# Patient Record
Sex: FEMALE | Race: WHITE | NOT HISPANIC OR LATINO
[De-identification: names, ages, dates, MRNs, and addresses within clinical notes are randomized per-mention and may not be internally consistent; named-entity substitution may affect disease eponyms.]

---

## 2018-11-13 ENCOUNTER — APPOINTMENT (OUTPATIENT)
Dept: INTERNAL MEDICINE | Facility: CLINIC | Age: 68
End: 2018-11-13
Payer: MEDICARE

## 2018-11-13 ENCOUNTER — APPOINTMENT (OUTPATIENT)
Dept: INTERNAL MEDICINE | Facility: CLINIC | Age: 68
End: 2018-11-13

## 2018-11-13 DIAGNOSIS — R09.89 OTHER SPECIFIED SYMPTOMS AND SIGNS INVOLVING THE CIRCULATORY AND RESPIRATORY SYSTEMS: ICD-10-CM

## 2018-11-13 PROCEDURE — 93880 EXTRACRANIAL BILAT STUDY: CPT

## 2018-11-13 PROCEDURE — 90670 PCV13 VACCINE IM: CPT

## 2018-11-13 PROCEDURE — G0009: CPT

## 2018-11-14 ENCOUNTER — TRANSCRIPTION ENCOUNTER (OUTPATIENT)
Age: 68
End: 2018-11-14

## 2019-01-01 PROBLEM — R09.89 BRUIT: Status: ACTIVE | Noted: 2019-01-01

## 2019-05-15 ENCOUNTER — APPOINTMENT (OUTPATIENT)
Dept: INTERNAL MEDICINE | Facility: CLINIC | Age: 69
End: 2019-05-15
Payer: MEDICARE

## 2019-05-15 PROCEDURE — 36415 COLL VENOUS BLD VENIPUNCTURE: CPT

## 2019-05-20 LAB
MEV IGG FLD QL IA: 70.1 AU/ML
MEV IGG+IGM SER-IMP: POSITIVE
MUV AB SER-ACNC: POSITIVE
MUV IGG SER QL IA: >300 AU/ML
RUBV IGG FLD-ACNC: 29.2 INDEX
RUBV IGG SER-IMP: POSITIVE

## 2022-09-05 ENCOUNTER — NON-APPOINTMENT (OUTPATIENT)
Age: 72
End: 2022-09-05

## 2023-01-18 ENCOUNTER — NON-APPOINTMENT (OUTPATIENT)
Age: 73
End: 2023-01-18

## 2023-01-18 ENCOUNTER — APPOINTMENT (OUTPATIENT)
Dept: INTERNAL MEDICINE | Facility: CLINIC | Age: 73
End: 2023-01-18
Payer: MEDICARE

## 2023-01-18 VITALS
HEART RATE: 66 BPM | DIASTOLIC BLOOD PRESSURE: 72 MMHG | TEMPERATURE: 98.1 F | WEIGHT: 105 LBS | OXYGEN SATURATION: 98 % | HEIGHT: 60 IN | SYSTOLIC BLOOD PRESSURE: 164 MMHG | BODY MASS INDEX: 20.62 KG/M2

## 2023-01-18 VITALS — DIASTOLIC BLOOD PRESSURE: 68 MMHG | SYSTOLIC BLOOD PRESSURE: 132 MMHG

## 2023-01-18 DIAGNOSIS — E55.9 VITAMIN D DEFICIENCY, UNSPECIFIED: ICD-10-CM

## 2023-01-18 DIAGNOSIS — R00.2 PALPITATIONS: ICD-10-CM

## 2023-01-18 DIAGNOSIS — E78.00 PURE HYPERCHOLESTEROLEMIA, UNSPECIFIED: ICD-10-CM

## 2023-01-18 DIAGNOSIS — E53.8 DEFICIENCY OF OTHER SPECIFIED B GROUP VITAMINS: ICD-10-CM

## 2023-01-18 DIAGNOSIS — E07.9 DISORDER OF THYROID, UNSPECIFIED: ICD-10-CM

## 2023-01-18 DIAGNOSIS — Z11.59 ENCOUNTER FOR SCREENING FOR OTHER VIRAL DISEASES: ICD-10-CM

## 2023-01-18 DIAGNOSIS — R73.9 HYPERGLYCEMIA, UNSPECIFIED: ICD-10-CM

## 2023-01-18 DIAGNOSIS — Z00.00 ENCOUNTER FOR GENERAL ADULT MEDICAL EXAMINATION W/OUT ABNORMAL FINDINGS: ICD-10-CM

## 2023-01-18 DIAGNOSIS — E03.9 HYPOTHYROIDISM, UNSPECIFIED: ICD-10-CM

## 2023-01-18 LAB
BILIRUB UR QL STRIP: NEGATIVE
CLARITY UR: CLEAR
COLLECTION METHOD: NORMAL
GLUCOSE UR-MCNC: NEGATIVE
HCG UR QL: 0.2 EU/DL
HGB UR QL STRIP.AUTO: NORMAL
KETONES UR-MCNC: NEGATIVE
LEUKOCYTE ESTERASE UR QL STRIP: NEGATIVE
NITRITE UR QL STRIP: NEGATIVE
PH UR STRIP: 6
PROT UR STRIP-MCNC: NEGATIVE
SP GR UR STRIP: 1.02

## 2023-01-18 PROCEDURE — 93000 ELECTROCARDIOGRAM COMPLETE: CPT | Mod: 59

## 2023-01-18 PROCEDURE — 81003 URINALYSIS AUTO W/O SCOPE: CPT | Mod: QW

## 2023-01-18 PROCEDURE — G0444 DEPRESSION SCREEN ANNUAL: CPT

## 2023-01-18 PROCEDURE — G0439: CPT

## 2023-01-18 RX ORDER — LEVOTHYROXINE SODIUM 75 UG/1
75 TABLET ORAL
Refills: 0 | Status: ACTIVE | COMMUNITY

## 2023-01-18 RX ORDER — ADHESIVE TAPE 3"X 2.3 YD
50 MCG TAPE, NON-MEDICATED TOPICAL
Refills: 0 | Status: ACTIVE | COMMUNITY

## 2023-01-18 NOTE — HEALTH RISK ASSESSMENT
[Very Good] : ~his/her~  mood as very good [Never] : Never [No] : In the past 12 months have you used drugs other than those required for medical reasons? No [No falls in past year] : Patient reported no falls in the past year [0] : 2) Feeling down, depressed, or hopeless: Not at all (0) [PHQ-2 Negative - No further assessment needed] : PHQ-2 Negative - No further assessment needed [Audit-CScore] : 0 [[id number]] : 0 [HIV test declined] : HIV test declined [Hepatitis C test declined] : Hepatitis C test declined [Change in mental status noted] : No change in mental status noted [None] : None [] :  [# Of Children ___] : has [unfilled] children [Feels Safe at Home] : Feels safe at home [Fully functional (bathing, dressing, toileting, transferring, walking, feeding)] : Fully functional (bathing, dressing, toileting, transferring, walking, feeding) [Fully functional (using the telephone, shopping, preparing meals, housekeeping, doing laundry, using] : Fully functional and needs no help or supervision to perform IADLs (using the telephone, shopping, preparing meals, housekeeping, doing laundry, using transportation, managing medications and managing finances) [Reports changes in hearing] : Reports no changes in hearing [Reports changes in vision] : Reports no changes in vision [Reports changes in dental health] : Reports changes in dental health [Smoke Detector] : smoke detector [Carbon Monoxide Detector] : carbon monoxide detector [Seat Belt] :  uses seat belt [Sunscreen] : uses sunscreen [MammogramDate] : 2021 [PapSmearDate] : 2021 [PapSmearComments] : Dr. Luna [BoneDensityDate] : 2021 [ColonoscopyDate] : 2013 [ColonoscopyComments] : Dr. Sky [FreeTextEntry3] : 10 grandchildren

## 2023-02-06 DIAGNOSIS — Z12.11 ENCOUNTER FOR SCREENING FOR MALIGNANT NEOPLASM OF COLON: ICD-10-CM

## 2023-02-08 ENCOUNTER — RESULT CHARGE (OUTPATIENT)
Age: 73
End: 2023-02-08

## 2023-02-09 DIAGNOSIS — R50.9 FEVER, UNSPECIFIED: ICD-10-CM

## 2023-02-09 DIAGNOSIS — R39.9 UNSPECIFIED SYMPTOMS AND SIGNS INVOLVING THE GENITOURINARY SYSTEM: ICD-10-CM

## 2023-02-10 ENCOUNTER — TRANSCRIPTION ENCOUNTER (OUTPATIENT)
Age: 73
End: 2023-02-10

## 2023-02-10 ENCOUNTER — LABORATORY RESULT (OUTPATIENT)
Age: 73
End: 2023-02-10

## 2023-02-13 ENCOUNTER — NON-APPOINTMENT (OUTPATIENT)
Age: 73
End: 2023-02-13

## 2023-02-13 DIAGNOSIS — K21.9 GASTRO-ESOPHAGEAL REFLUX DISEASE W/OUT ESOPHAGITIS: ICD-10-CM

## 2023-02-13 LAB
APPEARANCE: CLEAR
BASOPHILS # BLD AUTO: 0.04 K/UL
BASOPHILS NFR BLD AUTO: 0.4 %
BILIRUBIN URINE: NEGATIVE
BLOOD URINE: ABNORMAL
COLOR: YELLOW
CRP SERPL-MCNC: 144 MG/L
EOSINOPHIL # BLD AUTO: 0.01 K/UL
EOSINOPHIL NFR BLD AUTO: 0.1 %
GLUCOSE QUALITATIVE U: NEGATIVE
HCT VFR BLD CALC: 34 %
HGB BLD-MCNC: 10 G/DL
IMM GRANULOCYTES NFR BLD AUTO: 0.3 %
KETONES URINE: NORMAL
LEUKOCYTE ESTERASE URINE: ABNORMAL
LYMPHOCYTES # BLD AUTO: 1.07 K/UL
LYMPHOCYTES NFR BLD AUTO: 11.7 %
MAN DIFF?: NORMAL
MCHC RBC-ENTMCNC: 26.6 PG
MCHC RBC-ENTMCNC: 29.4 GM/DL
MCV RBC AUTO: 90.4 FL
MONOCYTES # BLD AUTO: 0.81 K/UL
MONOCYTES NFR BLD AUTO: 8.8 %
NEUTROPHILS # BLD AUTO: 7.2 K/UL
NEUTROPHILS NFR BLD AUTO: 78.7 %
NITRITE URINE: NEGATIVE
PH URINE: 6.5
PLATELET # BLD AUTO: 405 K/UL
PROTEIN URINE: ABNORMAL
RBC # BLD: 3.76 M/UL
RBC # FLD: 14.8 %
SPECIFIC GRAVITY URINE: 1.03
UROBILINOGEN URINE: NORMAL
WBC # FLD AUTO: 9.16 K/UL

## 2023-02-28 ENCOUNTER — OUTPATIENT (OUTPATIENT)
Dept: OUTPATIENT SERVICES | Facility: HOSPITAL | Age: 73
LOS: 1 days | Discharge: HOME | End: 2023-02-28

## 2023-02-28 ENCOUNTER — NON-APPOINTMENT (OUTPATIENT)
Age: 73
End: 2023-02-28

## 2023-02-28 DIAGNOSIS — M85.10: ICD-10-CM

## 2023-03-01 ENCOUNTER — RESULT REVIEW (OUTPATIENT)
Age: 73
End: 2023-03-01

## 2023-03-01 ENCOUNTER — APPOINTMENT (OUTPATIENT)
Dept: HEMATOLOGY ONCOLOGY | Facility: CLINIC | Age: 73
End: 2023-03-01
Payer: MEDICARE

## 2023-03-01 ENCOUNTER — NON-APPOINTMENT (OUTPATIENT)
Age: 73
End: 2023-03-01

## 2023-03-01 VITALS
WEIGHT: 97 LBS | BODY MASS INDEX: 19.82 KG/M2 | OXYGEN SATURATION: 97 % | HEIGHT: 58.66 IN | HEART RATE: 73 BPM | TEMPERATURE: 97 F | DIASTOLIC BLOOD PRESSURE: 76 MMHG | RESPIRATION RATE: 17 BRPM | SYSTOLIC BLOOD PRESSURE: 156 MMHG

## 2023-03-01 DIAGNOSIS — R63.4 ABNORMAL WEIGHT LOSS: ICD-10-CM

## 2023-03-01 DIAGNOSIS — M62.9 DISORDER OF MUSCLE, UNSPECIFIED: ICD-10-CM

## 2023-03-01 DIAGNOSIS — R53.83 OTHER FATIGUE: ICD-10-CM

## 2023-03-01 LAB
ALBUMIN SERPL ELPH-MCNC: 3.7 G/DL
ALP BLD-CCNC: 111 U/L
ALT SERPL-CCNC: 9 U/L
ANION GAP SERPL CALC-SCNC: 15 MMOL/L
AST SERPL-CCNC: 12 U/L
B2 MICROGLOB SERPL-MCNC: 2.2 MG/L
BASOPHILS # BLD AUTO: 0.03 K/UL — SIGNIFICANT CHANGE UP (ref 0–0.2)
BASOPHILS NFR BLD AUTO: 0.3 % — SIGNIFICANT CHANGE UP (ref 0–2)
BILIRUB SERPL-MCNC: 0.3 MG/DL
BUN SERPL-MCNC: 12 MG/DL
CALCIUM SERPL-MCNC: 9.8 MG/DL
CHLORIDE SERPL-SCNC: 99 MMOL/L
CO2 SERPL-SCNC: 25 MMOL/L
CREAT SERPL-MCNC: 0.62 MG/DL
EGFR: 95 ML/MIN/1.73M2
EOSINOPHIL # BLD AUTO: 0.01 K/UL — SIGNIFICANT CHANGE UP (ref 0–0.5)
EOSINOPHIL NFR BLD AUTO: 0.1 % — SIGNIFICANT CHANGE UP (ref 0–6)
FERRITIN SERPL-MCNC: 971 NG/ML
FOLATE SERPL-MCNC: 13.6 NG/ML
GLUCOSE SERPL-MCNC: 117 MG/DL
HAPTOGLOB SERPL-MCNC: 566 MG/DL
HCT VFR BLD CALC: 29.9 % — LOW (ref 34.5–45)
HGB BLD-MCNC: 8.9 G/DL — LOW (ref 11.5–15.5)
IMM GRANULOCYTES NFR BLD AUTO: 0.4 % — SIGNIFICANT CHANGE UP (ref 0–0.9)
IRON SATN MFR SERPL: 8 %
IRON SERPL-MCNC: 14 UG/DL
LDH SERPL-CCNC: 143 U/L
LYMPHOCYTES # BLD AUTO: 0.76 K/UL — LOW (ref 1–3.3)
LYMPHOCYTES # BLD AUTO: 8.5 % — LOW (ref 13–44)
MCHC RBC-ENTMCNC: 25.9 PG — LOW (ref 27–34)
MCHC RBC-ENTMCNC: 29.8 G/DL — LOW (ref 32–36)
MCV RBC AUTO: 87.2 FL — SIGNIFICANT CHANGE UP (ref 80–100)
MONOCYTES # BLD AUTO: 0.82 K/UL — SIGNIFICANT CHANGE UP (ref 0–0.9)
MONOCYTES NFR BLD AUTO: 9.2 % — SIGNIFICANT CHANGE UP (ref 2–14)
NEUTROPHILS # BLD AUTO: 7.24 K/UL — SIGNIFICANT CHANGE UP (ref 1.8–7.4)
NEUTROPHILS NFR BLD AUTO: 81.5 % — HIGH (ref 43–77)
NRBC # BLD: 0 /100 WBCS — SIGNIFICANT CHANGE UP (ref 0–0)
PLATELET # BLD AUTO: 407 K/UL — HIGH (ref 150–400)
POTASSIUM SERPL-SCNC: 4.7 MMOL/L
PROT SERPL-MCNC: 7.2 G/DL
RBC # BLD: 3.43 M/UL — LOW (ref 3.8–5.2)
RBC # FLD: 15.1 % — HIGH (ref 10.3–14.5)
RETICS #: 46.3 K/UL — SIGNIFICANT CHANGE UP (ref 25–125)
RETICS/RBC NFR: 1.4 % — SIGNIFICANT CHANGE UP (ref 0.5–2.5)
SODIUM SERPL-SCNC: 140 MMOL/L
TIBC SERPL-MCNC: 170 UG/DL
TSH SERPL-ACNC: 0.44 UIU/ML
UIBC SERPL-MCNC: 156 UG/DL
URATE SERPL-MCNC: 5.5 MG/DL
VIT B12 SERPL-MCNC: 1148 PG/ML
WBC # BLD: 8.9 K/UL — SIGNIFICANT CHANGE UP (ref 3.8–10.5)
WBC # FLD AUTO: 8.9 K/UL — SIGNIFICANT CHANGE UP (ref 3.8–10.5)

## 2023-03-01 PROCEDURE — 99205 OFFICE O/P NEW HI 60 MIN: CPT

## 2023-03-03 PROBLEM — R63.4 UNINTENTIONAL WEIGHT LOSS: Status: ACTIVE | Noted: 2023-03-03

## 2023-03-03 PROBLEM — R53.83 FATIGUE: Status: ACTIVE | Noted: 2023-03-03

## 2023-03-03 LAB
EPO SERPL-MCNC: 35 MIU/ML
FOLATE RBC-MCNC: 1346 NG/ML
HCT VFR BLD CALC: 30.6 %
ZINC SERPL-MCNC: 49 UG/DL

## 2023-03-08 LAB
ALBUMIN MFR SERPL ELPH: 41.6 %
ALBUMIN SERPL-MCNC: 3 G/DL
ALBUMIN/GLOB SERPL: 0.7 RATIO
ALPHA1 GLOB MFR SERPL ELPH: 10 %
ALPHA1 GLOB SERPL ELPH-MCNC: 0.7 G/DL
ALPHA2 GLOB MFR SERPL ELPH: 20.4 %
ALPHA2 GLOB SERPL ELPH-MCNC: 1.5 G/DL
B-GLOBULIN MFR SERPL ELPH: 13.1 %
B-GLOBULIN SERPL ELPH-MCNC: 0.9 G/DL
COPPER SERPL-MCNC: 229 UG/DL
CREAT 24H UR-MCNC: 0.7 G/24 H
CREAT ?TM UR-MCNC: 105 MG/DL
DEPRECATED KAPPA LC FREE/LAMBDA SER: 1.96 RATIO
GAMMA GLOB FLD ELPH-MCNC: 1.1 G/DL
GAMMA GLOB MFR SERPL ELPH: 14.9 %
IGA SER QL IEP: 172 MG/DL
IGG SER QL IEP: 1163 MG/DL
IGM SER QL IEP: 110 MG/DL
INTERPRETATION SERPL IEP-IMP: NORMAL
KAPPA LC CSF-MCNC: 1.77 MG/DL
KAPPA LC SERPL-MCNC: 3.47 MG/DL
M PROTEIN SPEC IFE-MCNC: NORMAL
PROT 24H UR-MRATE: 16 MG/DL
PROT ?TM UR-MCNC: 24 HR
PROT SERPL-MCNC: 7.2 G/DL
PROT SERPL-MCNC: 7.2 G/DL
PROT UR-MCNC: 104 MG/24 H
SPECIMEN VOL 24H UR: 650 ML

## 2023-03-11 LAB
ALBUPE: 12.4 %
ALPHA1UPE: 50.6 %
ALPHA2UPE: 21.4 %
BENCE JONES EXCRETION: 0 MG/24HR
BETAUPE: 8.5 %
CREAT 24H UR-MCNC: 0.7 G/24 H
CREATININE UR (MAYO): 105 MG/DL
GAMMAUPE: 7.1 %
IGA 24H UR QL IFE: NORMAL
KAPPA LC 24H UR QL: 0 MG/DL
M PROTEIN 24H MFR UR ELPH: 0 %
PROT ?TM UR-MCNC: 24 HR
PROT PATTERN 24H UR ELPH-IMP: NORMAL
PROT UR-MCNC: 15 MG/DL
PROT UR-MCNC: 15 MG/DL
SPECIMEN VOL 24H UR: 650 ML
U PROTEIN QNT CALCULATION: 98 MG/24 H

## 2023-03-13 ENCOUNTER — NON-APPOINTMENT (OUTPATIENT)
Age: 73
End: 2023-03-13

## 2023-03-13 RX ORDER — PANTOPRAZOLE 40 MG/1
40 TABLET, DELAYED RELEASE ORAL DAILY
Qty: 90 | Refills: 3 | Status: ACTIVE | COMMUNITY
Start: 2023-02-13 | End: 1900-01-01

## 2023-03-23 ENCOUNTER — LABORATORY RESULT (OUTPATIENT)
Age: 73
End: 2023-03-23

## 2023-03-23 ENCOUNTER — APPOINTMENT (OUTPATIENT)
Dept: HEMATOLOGY ONCOLOGY | Facility: CLINIC | Age: 73
End: 2023-03-23

## 2023-03-23 ENCOUNTER — RESULT REVIEW (OUTPATIENT)
Age: 73
End: 2023-03-23

## 2023-03-23 ENCOUNTER — APPOINTMENT (OUTPATIENT)
Dept: HEMATOLOGY ONCOLOGY | Facility: CLINIC | Age: 73
End: 2023-03-23
Payer: MEDICARE

## 2023-03-23 VITALS
OXYGEN SATURATION: 98 % | WEIGHT: 95.68 LBS | RESPIRATION RATE: 16 BRPM | DIASTOLIC BLOOD PRESSURE: 69 MMHG | TEMPERATURE: 97 F | BODY MASS INDEX: 19.55 KG/M2 | SYSTOLIC BLOOD PRESSURE: 144 MMHG | HEART RATE: 72 BPM

## 2023-03-23 LAB
BASOPHILS # BLD AUTO: 0.03 K/UL — SIGNIFICANT CHANGE UP (ref 0–0.2)
BASOPHILS NFR BLD AUTO: 0.4 % — SIGNIFICANT CHANGE UP (ref 0–2)
EOSINOPHIL # BLD AUTO: 0.01 K/UL — SIGNIFICANT CHANGE UP (ref 0–0.5)
EOSINOPHIL NFR BLD AUTO: 0.1 % — SIGNIFICANT CHANGE UP (ref 0–6)
HCT VFR BLD CALC: 25.9 % — LOW (ref 34.5–45)
HGB BLD-MCNC: 8 G/DL — LOW (ref 11.5–15.5)
IMM GRANULOCYTES NFR BLD AUTO: 0.9 % — SIGNIFICANT CHANGE UP (ref 0–0.9)
LYMPHOCYTES # BLD AUTO: 0.83 K/UL — LOW (ref 1–3.3)
LYMPHOCYTES # BLD AUTO: 9.8 % — LOW (ref 13–44)
MCHC RBC-ENTMCNC: 26.1 PG — LOW (ref 27–34)
MCHC RBC-ENTMCNC: 30.9 G/DL — LOW (ref 32–36)
MCV RBC AUTO: 84.4 FL — SIGNIFICANT CHANGE UP (ref 80–100)
MONOCYTES # BLD AUTO: 0.98 K/UL — HIGH (ref 0–0.9)
MONOCYTES NFR BLD AUTO: 11.6 % — SIGNIFICANT CHANGE UP (ref 2–14)
NEUTROPHILS # BLD AUTO: 6.55 K/UL — SIGNIFICANT CHANGE UP (ref 1.8–7.4)
NEUTROPHILS NFR BLD AUTO: 77.2 % — HIGH (ref 43–77)
NRBC # BLD: 0 /100 WBCS — SIGNIFICANT CHANGE UP (ref 0–0)
PLATELET # BLD AUTO: 371 K/UL — SIGNIFICANT CHANGE UP (ref 150–400)
RBC # BLD: 3.07 M/UL — LOW (ref 3.8–5.2)
RBC # FLD: 15.5 % — HIGH (ref 10.3–14.5)
WBC # BLD: 8.48 K/UL — SIGNIFICANT CHANGE UP (ref 3.8–10.5)
WBC # FLD AUTO: 8.48 K/UL — SIGNIFICANT CHANGE UP (ref 3.8–10.5)

## 2023-03-23 PROCEDURE — 38222 DX BONE MARROW BX & ASPIR: CPT

## 2023-03-23 NOTE — PROCEDURE
[Bone Marrow Biopsy] : bone marrow biopsy [Bone Marrow Aspiration] : bone marrow aspiration  [Patient] : the patient [Procedure verified and consent obtained] : procedure verified and consent obtained [Correct positioning] : correct positioning [Prone] : prone [The right posterior iliac crest was prepped with betadine and draped, using sterile technique.] : The right posterior iliac crest was prepped with betadine and draped, using sterile technique. [Lidocaine was injected and into the periosteum overlying the site.] : Lidocaine was injected and into the periosteum overlying the site. [Aspirate] : aspirate [Cytogenetics] : cytogenetics [FISH] : FISH [Biopsy] : biopsy [Flow Cytometry] : flow cytometry [] : The patient was instructed to remove the bandage the following AM. The patient may bathe. Acetaminophen may be taken for discomfort, as per package directions.If there are any other problems, the patient was instructed to call the office. The patient verbalized understanding, and is aware of the office contact numbers. [FreeTextEntry1] : MDS [FreeTextEntry2] : CBC prior to procedure.\par WBC 8.4\par Hgb 8.0\par Hct 25.9\par Plts 371\par BM biopsy and aspirate done.\par 6 cc's of 1 % Lidocaine injected.

## 2023-03-23 NOTE — REASON FOR VISIT
[Bone Marrow Biopsy] : bone marrow biopsy [Bone Marrow Aspiration] : bone marrow aspiration [FreeTextEntry2] : MDS

## 2023-04-03 ENCOUNTER — APPOINTMENT (OUTPATIENT)
Dept: HEMATOLOGY ONCOLOGY | Facility: CLINIC | Age: 73
End: 2023-04-03
Payer: MEDICARE

## 2023-04-03 ENCOUNTER — RESULT REVIEW (OUTPATIENT)
Age: 73
End: 2023-04-03

## 2023-04-03 ENCOUNTER — LABORATORY RESULT (OUTPATIENT)
Age: 73
End: 2023-04-03

## 2023-04-03 VITALS
WEIGHT: 95.46 LBS | BODY MASS INDEX: 19.5 KG/M2 | OXYGEN SATURATION: 99 % | TEMPERATURE: 97.2 F | HEART RATE: 82 BPM | DIASTOLIC BLOOD PRESSURE: 67 MMHG | SYSTOLIC BLOOD PRESSURE: 102 MMHG | RESPIRATION RATE: 16 BRPM

## 2023-04-03 LAB
BASOPHILS # BLD AUTO: 0.02 K/UL — SIGNIFICANT CHANGE UP (ref 0–0.2)
BASOPHILS NFR BLD AUTO: 0.2 % — SIGNIFICANT CHANGE UP (ref 0–2)
EOSINOPHIL # BLD AUTO: 0.02 K/UL — SIGNIFICANT CHANGE UP (ref 0–0.5)
EOSINOPHIL NFR BLD AUTO: 0.2 % — SIGNIFICANT CHANGE UP (ref 0–6)
HCT VFR BLD CALC: 24.3 % — LOW (ref 34.5–45)
HGB BLD-MCNC: 7 G/DL — CRITICAL LOW (ref 11.5–15.5)
IMM GRANULOCYTES NFR BLD AUTO: 0.4 % — SIGNIFICANT CHANGE UP (ref 0–0.9)
LYMPHOCYTES # BLD AUTO: 0.85 K/UL — LOW (ref 1–3.3)
LYMPHOCYTES # BLD AUTO: 9.2 % — LOW (ref 13–44)
MCHC RBC-ENTMCNC: 25 PG — LOW (ref 27–34)
MCHC RBC-ENTMCNC: 28.8 G/DL — LOW (ref 32–36)
MCV RBC AUTO: 86.8 FL — SIGNIFICANT CHANGE UP (ref 80–100)
MONOCYTES # BLD AUTO: 0.84 K/UL — SIGNIFICANT CHANGE UP (ref 0–0.9)
MONOCYTES NFR BLD AUTO: 9.1 % — SIGNIFICANT CHANGE UP (ref 2–14)
NEUTROPHILS # BLD AUTO: 7.43 K/UL — HIGH (ref 1.8–7.4)
NEUTROPHILS NFR BLD AUTO: 80.9 % — HIGH (ref 43–77)
NRBC # BLD: 0 /100 WBCS — SIGNIFICANT CHANGE UP (ref 0–0)
PLATELET # BLD AUTO: 425 K/UL — HIGH (ref 150–400)
RBC # BLD: 2.8 M/UL — LOW (ref 3.8–5.2)
RBC # FLD: 16 % — HIGH (ref 10.3–14.5)
WBC # BLD: 9.2 K/UL — SIGNIFICANT CHANGE UP (ref 3.8–10.5)
WBC # FLD AUTO: 9.2 K/UL — SIGNIFICANT CHANGE UP (ref 3.8–10.5)

## 2023-04-03 PROCEDURE — 99213 OFFICE O/P EST LOW 20 MIN: CPT

## 2023-04-03 NOTE — HISTORY OF PRESENT ILLNESS
[de-identified] : Ms. Rojas is a 71 yo Female with PMHx of anemia, hypothyroidism and vitamin D deficiency, who is seen in consult for skeletal lesions. Referred by her PCP, Dr. Castañeda.\par She reported developing back pain and some weakness in her legs for ~ 4 weeks. She was referred to see a physiatrist who evaluated her and ordered further diagnostic testing. Xray of left hip was done and was ruled out for fracture. However, MRI revealed: multilevel degenerative changes without significant spinal canal stenosis or nerve root compression. Nonspecific focus of marrow signal abnormality in L2. Further investigation with PET-CT showed multiple foci of intense FDG activity corresponding with osseous skeleton throughout her spine, pelvis, sacrum, iliac, left acetabulum (see result for detailed report). Today, she continues to have left sided low back pain that radiates to the left lateral thigh. She describes the pain to be constant and achy. It is worsened with sitting or lying down and improved with standing and walking. Denies numbness or tingling. \par She has been taking Tylenol everyday to help her sleep at night. Additionally, she was diagnosed with anemia in January 2023, and notes increased fatigue. She reports unintentional weight loss of 8 lbs in few months. No known renal insufficiency, or hypercalcemia. She denies fevers, night sweats. No recent infections or illnesses. Hx of wrist fractures in the past, but no new fx or bone pain. She is due for skeletal survey in May. \par \par \par \par

## 2023-04-03 NOTE — REASON FOR VISIT
[Initial Consultation] : an initial consultation for [Family Member] : family member [FreeTextEntry2] : skeletal lesions

## 2023-04-03 NOTE — END OF VISIT
[Time Spent: ___ minutes] : I have spent [unfilled] minutes of time on the encounter. [FreeTextEntry3] : Patient was seen with ACP who was present for the entire visit and has documented the visit. Note edited as needed.

## 2023-04-03 NOTE — REVIEW OF SYSTEMS
[Fatigue] : fatigue [Recent Change In Weight] : ~T recent weight change [Muscle Pain] : muscle pain [Negative] : Allergic/Immunologic [Fever] : no fever [Chills] : no chills [Night Sweats] : no night sweats [Muscle Weakness] : no muscle weakness [FreeTextEntry9] : low back pain radiating to left lateral thigh

## 2023-04-03 NOTE — RESULTS/DATA
[FreeTextEntry1] : 3/1/2023\par Hgb 8.9, MCV 87.2\par Platelet 407k\par Ferritin 971 \par CMP with normal kidney function, normal calcium\par Normal AST/Alk phos\par ALT 9 \par A/G ratio 1.05\par Beta 2 microglobulin 2.2\par Haptoglobin 566\par Pending other blood work \par \par 2/22 - MRI - Multilevel degenerate changes without significant spinal canal stenosis or nerve root compression.\par Nonspecific focus of marrow signal abnormality in L2\par \par 2/28 - PET-CT - Multiple foci of intense FDG activity corresponding with the osseous skeleton. One of the larger lesions is identified at the L2 vertebral body where there is a destructive lucent lesion on the left (SUV 12.1, approx 2.2cm). This appears malignant in nature. There is a small lucent lesion in the right L2 transverse process (SUV 4.6, ) There are additional lesions identified in the thoracic spine, and pelvis. A small lucent lesion in the anterior T12 vertebral body (SUV 10.2, 1.1cm) additional lesions in the spine are identified. there are multiple foci of FDG activity identified within the pelvis as well. For ex, a lesion in the right sacrum (SUV 8.1, 0.8cm) there are additional lesions identified in the left iliac bone and left acetabulum

## 2023-04-03 NOTE — ASSESSMENT
[FreeTextEntry1] : Ms. Rojas is a 73 yo Female with PMHx of anemia, hypothyroidism and vitamin D deficiency, who is seen in consult for skeletal lesions. She is newly anemic, increasingly fatigued and has 8 lb unintentional weight loss since January 2023. No renal insufficiency, hypercalcemia. Normal liver enzymes. AG ratio 1.05, which lowers suspicion for PCN. However primary bony malignancy cannot be ruled out. Will send work up to r/o MM or any other plasma cell neoplasms as well as full work up for anemia. May need BMBx. Will proceed with the following plan: \par \par [ ] Skeletal lytic lesion\par - PET-CT 2/28 showed multiple foci of intense FDG activity corresponding with osseous skeleton throughout her spine, pelvis, sacrum, iliac, left acetabulum\par - C/o back pain that radiates to left thigh \par - No renal insufficiency\par - No hypercalcemia \par - Unclear if it's primary bony lesion or secondary metastasis \par - MM work up sent \par - May need BMBx which was discussed in great details - pt agreeable  \par - Continue to f/u with physiatrist for pain \par \par [ ] Anemia, normocytic\par - Dx in Jan 2023\par - Hgb today 8.9, MCV 87.2\par - ? ARCH vs ? early MDS vs ?PCN\par - As mentioned above, may need BMBx\par - RTC in 1 month\par \par Patient seen and examined with Dr. Carcamo

## 2023-04-04 ENCOUNTER — RESULT REVIEW (OUTPATIENT)
Age: 73
End: 2023-04-04

## 2023-04-04 ENCOUNTER — APPOINTMENT (OUTPATIENT)
Dept: HEMATOLOGY ONCOLOGY | Facility: CLINIC | Age: 73
End: 2023-04-04

## 2023-04-04 ENCOUNTER — NON-APPOINTMENT (OUTPATIENT)
Age: 73
End: 2023-04-04

## 2023-04-04 ENCOUNTER — OUTPATIENT (OUTPATIENT)
Dept: OUTPATIENT SERVICES | Facility: HOSPITAL | Age: 73
LOS: 1 days | End: 2023-04-04
Payer: MEDICARE

## 2023-04-04 DIAGNOSIS — M85.10: ICD-10-CM

## 2023-04-04 LAB
ALBUMIN SERPL ELPH-MCNC: 3.3 G/DL
ALP BLD-CCNC: 135 U/L
ALT SERPL-CCNC: 39 U/L
ANION GAP SERPL CALC-SCNC: 15 MMOL/L
AST SERPL-CCNC: 25 U/L
BASOPHILS # BLD AUTO: 0.02 K/UL — SIGNIFICANT CHANGE UP (ref 0–0.2)
BASOPHILS NFR BLD AUTO: 0.2 % — SIGNIFICANT CHANGE UP (ref 0–2)
BILIRUB SERPL-MCNC: 0.2 MG/DL
BUN SERPL-MCNC: 22 MG/DL
CALCIUM SERPL-MCNC: 9.2 MG/DL
CHLORIDE SERPL-SCNC: 101 MMOL/L
CO2 SERPL-SCNC: 24 MMOL/L
CREAT SERPL-MCNC: 0.73 MG/DL
EGFR: 87 ML/MIN/1.73M2
EOSINOPHIL # BLD AUTO: 0.01 K/UL — SIGNIFICANT CHANGE UP (ref 0–0.5)
EOSINOPHIL NFR BLD AUTO: 0.1 % — SIGNIFICANT CHANGE UP (ref 0–6)
GLUCOSE SERPL-MCNC: 123 MG/DL
HAPTOGLOB SERPL-MCNC: 546 MG/DL
HCT VFR BLD CALC: 24.1 % — LOW (ref 34.5–45)
HGB BLD-MCNC: 7.1 G/DL — LOW (ref 11.5–15.5)
IMM GRANULOCYTES NFR BLD AUTO: 0.8 % — SIGNIFICANT CHANGE UP (ref 0–0.9)
LDH SERPL-CCNC: 150 U/L
LYMPHOCYTES # BLD AUTO: 0.85 K/UL — LOW (ref 1–3.3)
LYMPHOCYTES # BLD AUTO: 8.7 % — LOW (ref 13–44)
MCHC RBC-ENTMCNC: 25.4 PG — LOW (ref 27–34)
MCHC RBC-ENTMCNC: 29.6 G/DL — LOW (ref 32–36)
MCV RBC AUTO: 86 FL — SIGNIFICANT CHANGE UP (ref 80–100)
MONOCYTES # BLD AUTO: 0.95 K/UL — HIGH (ref 0–0.9)
MONOCYTES NFR BLD AUTO: 9.7 % — SIGNIFICANT CHANGE UP (ref 2–14)
NEUTROPHILS # BLD AUTO: 7.87 K/UL — HIGH (ref 1.8–7.4)
NEUTROPHILS NFR BLD AUTO: 80.5 % — HIGH (ref 43–77)
NRBC # BLD: 0 /100 WBCS — SIGNIFICANT CHANGE UP (ref 0–0)
PLATELET # BLD AUTO: 414 K/UL — HIGH (ref 150–400)
POTASSIUM SERPL-SCNC: 4.7 MMOL/L
PROT SERPL-MCNC: 6.6 G/DL
RBC # BLD: 2.79 M/UL — LOW (ref 3.8–5.2)
RBC # FLD: 16.1 % — HIGH (ref 10.3–14.5)
SODIUM SERPL-SCNC: 140 MMOL/L
WBC # BLD: 9.78 K/UL — SIGNIFICANT CHANGE UP (ref 3.8–10.5)
WBC # FLD AUTO: 9.78 K/UL — SIGNIFICANT CHANGE UP (ref 3.8–10.5)

## 2023-04-04 NOTE — HISTORY OF PRESENT ILLNESS
[de-identified] : Ms. Rojas is a 73 yo Female with PMHx of anemia, hypothyroidism and vitamin D deficiency, who is seen in consult for skeletal lesions. Referred by her PCP, Dr. Castañeda.\par She reported developing back pain and some weakness in her legs for ~ 4 weeks. She was referred to see a physiatrist who evaluated her and ordered further diagnostic testing. Xray of left hip was done and was ruled out for fracture. However, MRI revealed: multilevel degenerative changes without significant spinal canal stenosis or nerve root compression. Nonspecific focus of marrow signal abnormality in L2. Further investigation with PET-CT showed multiple foci of intense FDG activity corresponding with osseous skeleton throughout her spine, pelvis, sacrum, iliac, left acetabulum (see result for detailed report). Today, she continues to have left sided low back pain that radiates to the left lateral thigh. She describes the pain to be constant and achy. It is worsened with sitting or lying down and improved with standing and walking. Denies numbness or tingling. \par She has been taking Tylenol everyday to help her sleep at night. Additionally, she was diagnosed with anemia in January 2023, and notes increased fatigue. She reports unintentional weight loss of 8 lbs in few months. No known renal insufficiency, or hypercalcemia. She denies fevers, night sweats. No recent infections or illnesses. Hx of wrist fractures in the past, but no new fx or bone pain. She is due for skeletal survey in May. \par \par \par \par  [de-identified] : 4/3/2023\par Patient returns for a follow up. She has done well in the interim. Has not lost any further weight. Has had the bone marrow biopsy and PCN w/u done. The lab results are not suggestive of PCN. BMB showed trilineage hematopoiesis, not suggestive if PCN; no dysplasia, but has ATRX mutation.

## 2023-04-04 NOTE — ASSESSMENT
[FreeTextEntry1] : Ms. Rojas is a 71 yo Female with PMHx of anemia, hypothyroidism and vitamin D deficiency, who is seen in consult for skeletal lesions. She is newly anemic, increasingly fatigued and has 8 lb unintentional weight loss since January 2023. No renal insufficiency, hypercalcemia. Lab data and bone marrow biopsy are not suggestive of Myeloma. Noted to have ATRX mutation, which may explain anemia. Patient will eed a bone biopsy. \par \par [ ] Skeletal lytic lesion\par - PET-CT 2/28 showed multiple foci of intense FDG activity corresponding with osseous skeleton throughout her spine, pelvis, sacrum, iliac, left acetabulum\par - C/o back pain that radiates to left thigh \par - No renal insufficiency\par - No hypercalcemia \par - Unclear if it's primary bony lesion or secondary metastasis \par - MM work up; Normal SPEP, UPEP, No band in SIFE, UIFE \par - BMBx: (Normal) Trilineage hematopoiesis , no dysplasia\par - OnkoSight: ATRX mutation \par - May explain the anemia due to Hgb H \par - Schedule a Bone biopsy with IR\par \par \par [ ] Anemia, normocytic\par - Dx in Jan 2023\par - ? ARCH vs  CHIP (? early MDS) \par - BMBx: (Normal) Trilineage hematopoiesis , no dysplasia\par - ATRX mutation: Hemoglobin H disease? \par - Hemoglobin electrophoresis\par - Alpha globin gene study \par - Supportive transfusion per protocol \par - RTC in 2 months\par

## 2023-04-04 NOTE — REASON FOR VISIT
[Family Member] : family member [Follow-Up Visit] : a follow-up visit for [FreeTextEntry2] : skeletal lesions, anemia

## 2023-04-05 ENCOUNTER — APPOINTMENT (OUTPATIENT)
Dept: INFUSION THERAPY | Facility: HOSPITAL | Age: 73
End: 2023-04-05

## 2023-04-06 LAB — EPO SERPL-MCNC: 77.2 MIU/ML

## 2023-04-07 LAB
HGB A MFR BLD: 98 %
HGB A2 MFR BLD: 2 %
HGB FRACT BLD-IMP: NORMAL

## 2023-04-10 DIAGNOSIS — M89.9 DISORDER OF BONE, UNSPECIFIED: ICD-10-CM

## 2023-04-10 DIAGNOSIS — D64.9 ANEMIA, UNSPECIFIED: ICD-10-CM

## 2023-04-10 DIAGNOSIS — Z51.89 ENCOUNTER FOR OTHER SPECIFIED AFTERCARE: ICD-10-CM

## 2023-04-11 ENCOUNTER — APPOINTMENT (OUTPATIENT)
Dept: HEMATOLOGY ONCOLOGY | Facility: CLINIC | Age: 73
End: 2023-04-11

## 2023-04-11 ENCOUNTER — RESULT REVIEW (OUTPATIENT)
Age: 73
End: 2023-04-11

## 2023-04-11 LAB
BASOPHILS # BLD AUTO: 0.02 K/UL — SIGNIFICANT CHANGE UP (ref 0–0.2)
BASOPHILS NFR BLD AUTO: 0.3 % — SIGNIFICANT CHANGE UP (ref 0–2)
EOSINOPHIL # BLD AUTO: 0.02 K/UL — SIGNIFICANT CHANGE UP (ref 0–0.5)
EOSINOPHIL NFR BLD AUTO: 0.3 % — SIGNIFICANT CHANGE UP (ref 0–6)
HCT VFR BLD CALC: 33.5 % — LOW (ref 34.5–45)
HGB BLD-MCNC: 10 G/DL — LOW (ref 11.5–15.5)
IMM GRANULOCYTES NFR BLD AUTO: 0.5 % — SIGNIFICANT CHANGE UP (ref 0–0.9)
LYMPHOCYTES # BLD AUTO: 0.59 K/UL — LOW (ref 1–3.3)
LYMPHOCYTES # BLD AUTO: 7.4 % — LOW (ref 13–44)
MCHC RBC-ENTMCNC: 25.8 PG — LOW (ref 27–34)
MCHC RBC-ENTMCNC: 29.9 G/DL — LOW (ref 32–36)
MCV RBC AUTO: 86.6 FL — SIGNIFICANT CHANGE UP (ref 80–100)
MONOCYTES # BLD AUTO: 0.77 K/UL — SIGNIFICANT CHANGE UP (ref 0–0.9)
MONOCYTES NFR BLD AUTO: 9.6 % — SIGNIFICANT CHANGE UP (ref 2–14)
NEUTROPHILS # BLD AUTO: 6.55 K/UL — SIGNIFICANT CHANGE UP (ref 1.8–7.4)
NEUTROPHILS NFR BLD AUTO: 81.9 % — HIGH (ref 43–77)
NRBC # BLD: 0 /100 WBCS — SIGNIFICANT CHANGE UP (ref 0–0)
PLATELET # BLD AUTO: 391 K/UL — SIGNIFICANT CHANGE UP (ref 150–400)
RBC # BLD: 3.87 M/UL — SIGNIFICANT CHANGE UP (ref 3.8–5.2)
RBC # FLD: 16.3 % — HIGH (ref 10.3–14.5)
WBC # BLD: 7.99 K/UL — SIGNIFICANT CHANGE UP (ref 3.8–10.5)
WBC # FLD AUTO: 7.99 K/UL — SIGNIFICANT CHANGE UP (ref 3.8–10.5)

## 2023-04-24 ENCOUNTER — RESULT REVIEW (OUTPATIENT)
Age: 73
End: 2023-04-24

## 2023-04-24 ENCOUNTER — APPOINTMENT (OUTPATIENT)
Dept: HEMATOLOGY ONCOLOGY | Facility: CLINIC | Age: 73
End: 2023-04-24

## 2023-04-24 LAB
ALBUMIN SERPL ELPH-MCNC: 3.2 G/DL
ALP BLD-CCNC: 218 U/L
ALT SERPL-CCNC: 43 U/L
ANION GAP SERPL CALC-SCNC: 15 MMOL/L
AST SERPL-CCNC: 25 U/L
BASOPHILS # BLD AUTO: 0.03 K/UL — SIGNIFICANT CHANGE UP (ref 0–0.2)
BASOPHILS NFR BLD AUTO: 0.4 % — SIGNIFICANT CHANGE UP (ref 0–2)
BILIRUB SERPL-MCNC: 0.3 MG/DL
BUN SERPL-MCNC: 15 MG/DL
CALCIUM SERPL-MCNC: 9.4 MG/DL
CHLORIDE SERPL-SCNC: 99 MMOL/L
CO2 SERPL-SCNC: 25 MMOL/L
CREAT SERPL-MCNC: 0.65 MG/DL
EGFR: 93 ML/MIN/1.73M2
EOSINOPHIL # BLD AUTO: 0.02 K/UL — SIGNIFICANT CHANGE UP (ref 0–0.5)
EOSINOPHIL NFR BLD AUTO: 0.3 % — SIGNIFICANT CHANGE UP (ref 0–6)
GLUCOSE SERPL-MCNC: 105 MG/DL
HCT VFR BLD CALC: 28.7 % — LOW (ref 34.5–45)
HGB BLD-MCNC: 8.6 G/DL — LOW (ref 11.5–15.5)
IMM GRANULOCYTES NFR BLD AUTO: 0.8 % — SIGNIFICANT CHANGE UP (ref 0–0.9)
LYMPHOCYTES # BLD AUTO: 0.79 K/UL — LOW (ref 1–3.3)
LYMPHOCYTES # BLD AUTO: 10.5 % — LOW (ref 13–44)
MAGNESIUM SERPL-MCNC: 1.6 MG/DL
MCHC RBC-ENTMCNC: 26.3 PG — LOW (ref 27–34)
MCHC RBC-ENTMCNC: 30 G/DL — LOW (ref 32–36)
MCV RBC AUTO: 87.8 FL — SIGNIFICANT CHANGE UP (ref 80–100)
MONOCYTES # BLD AUTO: 0.65 K/UL — SIGNIFICANT CHANGE UP (ref 0–0.9)
MONOCYTES NFR BLD AUTO: 8.6 % — SIGNIFICANT CHANGE UP (ref 2–14)
NEUTROPHILS # BLD AUTO: 5.98 K/UL — SIGNIFICANT CHANGE UP (ref 1.8–7.4)
NEUTROPHILS NFR BLD AUTO: 79.4 % — HIGH (ref 43–77)
NRBC # BLD: 0 /100 WBCS — SIGNIFICANT CHANGE UP (ref 0–0)
PHOSPHATE SERPL-MCNC: 3.6 MG/DL
PLATELET # BLD AUTO: 365 K/UL — SIGNIFICANT CHANGE UP (ref 150–400)
POTASSIUM SERPL-SCNC: 4.4 MMOL/L
PROT SERPL-MCNC: 6.5 G/DL
RBC # BLD: 3.27 M/UL — LOW (ref 3.8–5.2)
RBC # FLD: 16.4 % — HIGH (ref 10.3–14.5)
SODIUM SERPL-SCNC: 139 MMOL/L
WBC # BLD: 7.53 K/UL — SIGNIFICANT CHANGE UP (ref 3.8–10.5)
WBC # FLD AUTO: 7.53 K/UL — SIGNIFICANT CHANGE UP (ref 3.8–10.5)

## 2023-05-02 ENCOUNTER — APPOINTMENT (OUTPATIENT)
Dept: INTERVENTIONAL RADIOLOGY/VASCULAR | Facility: CLINIC | Age: 73
End: 2023-05-02
Payer: MEDICARE

## 2023-05-02 ENCOUNTER — OUTPATIENT (OUTPATIENT)
Dept: OUTPATIENT SERVICES | Facility: HOSPITAL | Age: 73
LOS: 1 days | Discharge: HOME | End: 2023-05-02

## 2023-05-02 VITALS — BODY MASS INDEX: 18.95 KG/M2 | WEIGHT: 94 LBS | HEIGHT: 59 IN

## 2023-05-02 DIAGNOSIS — M85.10: ICD-10-CM

## 2023-05-02 DIAGNOSIS — M89.9 DISORDER OF BONE, UNSPECIFIED: ICD-10-CM

## 2023-05-02 DIAGNOSIS — Z78.9 OTHER SPECIFIED HEALTH STATUS: ICD-10-CM

## 2023-05-02 PROCEDURE — 99203 OFFICE O/P NEW LOW 30 MIN: CPT | Mod: 95

## 2023-05-02 NOTE — DATA REVIEWED
[FreeTextEntry1] : MRI and PET/Ct reviewed with patient. All questions answered during consultation.

## 2023-05-02 NOTE — REASON FOR VISIT
[Consultation] : a consultation visit [Home] : at home, [unfilled] , at the time of the visit. [Medical Office: ([facility])___] : at the medical office located in  [Patient] : the patient [Self] : self [FreeTextEntry1] : L2 lesion biopsy

## 2023-05-02 NOTE — HISTORY OF PRESENT ILLNESS
[FreeTextEntry1] : Patient is a 72 year old female with back pain and leg weakness starting February 2022. Workup for back pain revealed multiple skeletal lesions on MRI and PET. She has been referred to IR by Dr. Carcamo for consultation regarding an L2 lesion biopsy. \par \par The full procedure of CT-guided L2 lesion biopsy was discussed with the patient.  This included a discussion of the risks, benefits, and alternatives.  Risk of bleeding, infection, adjacent organ injury including nerve injury and pain were discussed.  Ample time was provided to answer questions.  Consent was obtained at the time of consultation.\par \par Plan: \par CT-guided L2 lytic lesion biopsy with sedation.  Patient in prone position\par

## 2023-05-03 LAB — HBA1 GENE MUT ANL BLD/T: NORMAL

## 2023-05-05 ENCOUNTER — RESULT REVIEW (OUTPATIENT)
Age: 73
End: 2023-05-05

## 2023-05-05 ENCOUNTER — OUTPATIENT (OUTPATIENT)
Dept: OUTPATIENT SERVICES | Facility: HOSPITAL | Age: 73
LOS: 1 days | End: 2023-05-05
Payer: MEDICARE

## 2023-05-05 DIAGNOSIS — M89.9 DISORDER OF BONE, UNSPECIFIED: ICD-10-CM

## 2023-05-05 PROCEDURE — 77012 CT SCAN FOR NEEDLE BIOPSY: CPT | Mod: 26

## 2023-05-05 PROCEDURE — 88307 TISSUE EXAM BY PATHOLOGIST: CPT | Mod: 26

## 2023-05-05 PROCEDURE — 20225 BONE BIOPSY TROCAR/NDL DEEP: CPT

## 2023-05-05 PROCEDURE — 88342 IMHCHEM/IMCYTCHM 1ST ANTB: CPT | Mod: 26

## 2023-05-05 PROCEDURE — 88341 IMHCHEM/IMCYTCHM EA ADD ANTB: CPT | Mod: 26

## 2023-05-05 PROCEDURE — 88173 CYTOPATH EVAL FNA REPORT: CPT | Mod: 26

## 2023-05-05 NOTE — PRE PROCEDURE NOTE - HISTORY OF PRESENT ILLNESS
Interventional Radiology  Pre-Procedure Note    This is a 72y  Female  presenting for biopsy    HPI:  72 year old female with back pain and leg weakness starting February 2022. Workup for back pain revealed multiple skeletal lesions on MRI and PET. She has been referred to IR by Dr. Carcamo for consultation regarding an L2 lesion biopsy.     PAST MEDICAL & SURGICAL HISTORY:      Social History:     FAMILY HISTORY:      Allergies: Allergy Status Unknown      Current Medications:     Labs:   CBC for Oncology (04.24.23 @ 09:15)    WBC Count: 7.53 K/uL   RBC Count: 3.27 M/uL   Hemoglobin: 8.6 g/dL   Hematocrit: 28.7 %   Mean Cell Volume: 87.8 fl   Mean Cell Hemoglobin: 26.3 pg   Mean Cell Hemoglobin Conc: 30.0 g/dL   Red Cell Distrib Width: 16.4 %   Platelet Count - Automated: 365 K/uL      Assessment/Plan:   This is a 72y Female  presents with L2 lesion  Patient presents to IR for CT biopsy.  Procedure/ risks/ benefits/ goals/ alternatives were explained. All questions answered. Informed content obtained from patient. Consent placed in chart.

## 2023-05-09 ENCOUNTER — APPOINTMENT (OUTPATIENT)
Dept: HEMATOLOGY ONCOLOGY | Facility: CLINIC | Age: 73
End: 2023-05-09

## 2023-05-09 ENCOUNTER — RESULT REVIEW (OUTPATIENT)
Age: 73
End: 2023-05-09

## 2023-05-09 LAB
BASOPHILS # BLD AUTO: 0.03 K/UL — SIGNIFICANT CHANGE UP (ref 0–0.2)
BASOPHILS NFR BLD AUTO: 0.4 % — SIGNIFICANT CHANGE UP (ref 0–2)
EOSINOPHIL # BLD AUTO: 0.03 K/UL — SIGNIFICANT CHANGE UP (ref 0–0.5)
EOSINOPHIL NFR BLD AUTO: 0.4 % — SIGNIFICANT CHANGE UP (ref 0–6)
HCT VFR BLD CALC: 23.5 % — LOW (ref 34.5–45)
HGB BLD-MCNC: 7.2 G/DL — LOW (ref 11.5–15.5)
IMM GRANULOCYTES NFR BLD AUTO: 1 % — HIGH (ref 0–0.9)
LYMPHOCYTES # BLD AUTO: 0.79 K/UL — LOW (ref 1–3.3)
LYMPHOCYTES # BLD AUTO: 9.6 % — LOW (ref 13–44)
MCHC RBC-ENTMCNC: 26.1 PG — LOW (ref 27–34)
MCHC RBC-ENTMCNC: 30.6 G/DL — LOW (ref 32–36)
MCV RBC AUTO: 85.1 FL — SIGNIFICANT CHANGE UP (ref 80–100)
MONOCYTES # BLD AUTO: 0.78 K/UL — SIGNIFICANT CHANGE UP (ref 0–0.9)
MONOCYTES NFR BLD AUTO: 9.5 % — SIGNIFICANT CHANGE UP (ref 2–14)
NEUTROPHILS # BLD AUTO: 6.52 K/UL — SIGNIFICANT CHANGE UP (ref 1.8–7.4)
NEUTROPHILS NFR BLD AUTO: 79.1 % — HIGH (ref 43–77)
NRBC # BLD: 0 /100 WBCS — SIGNIFICANT CHANGE UP (ref 0–0)
PLATELET # BLD AUTO: 348 K/UL — SIGNIFICANT CHANGE UP (ref 150–400)
RBC # BLD: 2.76 M/UL — LOW (ref 3.8–5.2)
RBC # FLD: 16.8 % — HIGH (ref 10.3–14.5)
WBC # BLD: 8.23 K/UL — SIGNIFICANT CHANGE UP (ref 3.8–10.5)
WBC # FLD AUTO: 8.23 K/UL — SIGNIFICANT CHANGE UP (ref 3.8–10.5)

## 2023-05-10 LAB — NON-GYNECOLOGICAL CYTOLOGY STUDY: SIGNIFICANT CHANGE UP

## 2023-05-11 ENCOUNTER — RESULT REVIEW (OUTPATIENT)
Age: 73
End: 2023-05-11

## 2023-05-11 ENCOUNTER — APPOINTMENT (OUTPATIENT)
Dept: HEMATOLOGY ONCOLOGY | Facility: CLINIC | Age: 73
End: 2023-05-11

## 2023-05-11 ENCOUNTER — OUTPATIENT (OUTPATIENT)
Dept: OUTPATIENT SERVICES | Facility: HOSPITAL | Age: 73
LOS: 1 days | End: 2023-05-11
Payer: MEDICARE

## 2023-05-11 DIAGNOSIS — D64.9 ANEMIA, UNSPECIFIED: ICD-10-CM

## 2023-05-11 DIAGNOSIS — M89.9 DISORDER OF BONE, UNSPECIFIED: ICD-10-CM

## 2023-05-11 LAB
ALPHA - GLOBIN COMMON MUTATION RESULT: NORMAL
BASOPHILS # BLD AUTO: 0.02 K/UL — SIGNIFICANT CHANGE UP (ref 0–0.2)
BASOPHILS NFR BLD AUTO: 0.2 % — SIGNIFICANT CHANGE UP (ref 0–2)
EOSINOPHIL # BLD AUTO: 0.01 K/UL — SIGNIFICANT CHANGE UP (ref 0–0.5)
EOSINOPHIL NFR BLD AUTO: 0.1 % — SIGNIFICANT CHANGE UP (ref 0–6)
HCT VFR BLD CALC: 26 % — LOW (ref 34.5–45)
HGB BLD-MCNC: 7.5 G/DL — LOW (ref 11.5–15.5)
IMM GRANULOCYTES NFR BLD AUTO: 0.6 % — SIGNIFICANT CHANGE UP (ref 0–0.9)
LYMPHOCYTES # BLD AUTO: 0.81 K/UL — LOW (ref 1–3.3)
LYMPHOCYTES # BLD AUTO: 10 % — LOW (ref 13–44)
MCHC RBC-ENTMCNC: 25.5 PG — LOW (ref 27–34)
MCHC RBC-ENTMCNC: 28.8 G/DL — LOW (ref 32–36)
MCV RBC AUTO: 88.4 FL — SIGNIFICANT CHANGE UP (ref 80–100)
MONOCYTES # BLD AUTO: 0.79 K/UL — SIGNIFICANT CHANGE UP (ref 0–0.9)
MONOCYTES NFR BLD AUTO: 9.8 % — SIGNIFICANT CHANGE UP (ref 2–14)
NEUTROPHILS # BLD AUTO: 6.41 K/UL — SIGNIFICANT CHANGE UP (ref 1.8–7.4)
NEUTROPHILS NFR BLD AUTO: 79.3 % — HIGH (ref 43–77)
NRBC # BLD: 0 /100 WBCS — SIGNIFICANT CHANGE UP (ref 0–0)
PLATELET # BLD AUTO: 399 K/UL — SIGNIFICANT CHANGE UP (ref 150–400)
RBC # BLD: 2.94 M/UL — LOW (ref 3.8–5.2)
RBC # FLD: 17.1 % — HIGH (ref 10.3–14.5)
WBC # BLD: 8.09 K/UL — SIGNIFICANT CHANGE UP (ref 3.8–10.5)
WBC # FLD AUTO: 8.09 K/UL — SIGNIFICANT CHANGE UP (ref 3.8–10.5)

## 2023-05-11 PROCEDURE — 86901 BLOOD TYPING SEROLOGIC RH(D): CPT

## 2023-05-11 PROCEDURE — 86900 BLOOD TYPING SEROLOGIC ABO: CPT

## 2023-05-11 PROCEDURE — 86923 COMPATIBILITY TEST ELECTRIC: CPT

## 2023-05-11 PROCEDURE — 86850 RBC ANTIBODY SCREEN: CPT

## 2023-05-12 ENCOUNTER — NON-APPOINTMENT (OUTPATIENT)
Age: 73
End: 2023-05-12

## 2023-05-13 ENCOUNTER — RESULT REVIEW (OUTPATIENT)
Age: 73
End: 2023-05-13

## 2023-05-13 ENCOUNTER — APPOINTMENT (OUTPATIENT)
Dept: INFUSION THERAPY | Facility: HOSPITAL | Age: 73
End: 2023-05-13

## 2023-05-13 LAB
BASOPHILS # BLD AUTO: 0.02 K/UL — SIGNIFICANT CHANGE UP (ref 0–0.2)
BASOPHILS NFR BLD AUTO: 0.3 % — SIGNIFICANT CHANGE UP (ref 0–2)
EOSINOPHIL # BLD AUTO: 0.05 K/UL — SIGNIFICANT CHANGE UP (ref 0–0.5)
EOSINOPHIL NFR BLD AUTO: 0.8 % — SIGNIFICANT CHANGE UP (ref 0–6)
HCT VFR BLD CALC: 22.4 % — LOW (ref 34.5–45)
HGB BLD-MCNC: 6.7 G/DL — CRITICAL LOW (ref 11.5–15.5)
IMM GRANULOCYTES NFR BLD AUTO: 1.1 % — HIGH (ref 0–0.9)
LYMPHOCYTES # BLD AUTO: 0.63 K/UL — LOW (ref 1–3.3)
LYMPHOCYTES # BLD AUTO: 9.5 % — LOW (ref 13–44)
MCHC RBC-ENTMCNC: 26.1 PG — LOW (ref 27–34)
MCHC RBC-ENTMCNC: 29.9 G/DL — LOW (ref 32–36)
MCV RBC AUTO: 87.2 FL — SIGNIFICANT CHANGE UP (ref 80–100)
MONOCYTES # BLD AUTO: 0.54 K/UL — SIGNIFICANT CHANGE UP (ref 0–0.9)
MONOCYTES NFR BLD AUTO: 8.1 % — SIGNIFICANT CHANGE UP (ref 2–14)
NEUTROPHILS # BLD AUTO: 5.35 K/UL — SIGNIFICANT CHANGE UP (ref 1.8–7.4)
NEUTROPHILS NFR BLD AUTO: 80.2 % — HIGH (ref 43–77)
NRBC # BLD: 0 /100 WBCS — SIGNIFICANT CHANGE UP (ref 0–0)
PLATELET # BLD AUTO: 326 K/UL — SIGNIFICANT CHANGE UP (ref 150–400)
RBC # BLD: 2.57 M/UL — LOW (ref 3.8–5.2)
RBC # FLD: 17.3 % — HIGH (ref 10.3–14.5)
WBC # BLD: 6.66 K/UL — SIGNIFICANT CHANGE UP (ref 3.8–10.5)
WBC # FLD AUTO: 6.66 K/UL — SIGNIFICANT CHANGE UP (ref 3.8–10.5)

## 2023-05-15 ENCOUNTER — INPATIENT (INPATIENT)
Facility: HOSPITAL | Age: 73
LOS: 7 days | Discharge: HOME | End: 2023-05-23
Attending: INTERNAL MEDICINE | Admitting: INTERNAL MEDICINE
Payer: MEDICARE

## 2023-05-15 ENCOUNTER — APPOINTMENT (OUTPATIENT)
Dept: INTERNAL MEDICINE | Facility: AMBULATORY MEDICAL SERVICES | Age: 73
End: 2023-05-15

## 2023-05-15 VITALS
DIASTOLIC BLOOD PRESSURE: 67 MMHG | SYSTOLIC BLOOD PRESSURE: 161 MMHG | HEART RATE: 60 BPM | TEMPERATURE: 98 F | OXYGEN SATURATION: 99 % | RESPIRATION RATE: 16 BRPM

## 2023-05-15 DIAGNOSIS — K21.9 GASTRO-ESOPHAGEAL REFLUX DISEASE WITHOUT ESOPHAGITIS: ICD-10-CM

## 2023-05-15 DIAGNOSIS — E03.9 HYPOTHYROIDISM, UNSPECIFIED: ICD-10-CM

## 2023-05-15 DIAGNOSIS — C79.9 SECONDARY MALIGNANT NEOPLASM OF UNSPECIFIED SITE: ICD-10-CM

## 2023-05-15 DIAGNOSIS — Z51.89 ENCOUNTER FOR OTHER SPECIFIED AFTERCARE: ICD-10-CM

## 2023-05-15 DIAGNOSIS — D64.9 ANEMIA, UNSPECIFIED: ICD-10-CM

## 2023-05-15 DIAGNOSIS — R11.2 NAUSEA WITH VOMITING, UNSPECIFIED: ICD-10-CM

## 2023-05-15 DIAGNOSIS — I74.11 EMBOLISM AND THROMBOSIS OF THORACIC AORTA: ICD-10-CM

## 2023-05-15 DIAGNOSIS — C80.1 MALIGNANT (PRIMARY) NEOPLASM, UNSPECIFIED: ICD-10-CM

## 2023-05-15 DIAGNOSIS — G89.3 NEOPLASM RELATED PAIN (ACUTE) (CHRONIC): ICD-10-CM

## 2023-05-15 DIAGNOSIS — Z29.9 ENCOUNTER FOR PROPHYLACTIC MEASURES, UNSPECIFIED: ICD-10-CM

## 2023-05-15 LAB
ALBUMIN SERPL ELPH-MCNC: 3.4 G/DL — SIGNIFICANT CHANGE UP (ref 3.3–5)
ALP SERPL-CCNC: 370 U/L — HIGH (ref 40–120)
ALT FLD-CCNC: 41 U/L — HIGH (ref 4–33)
ANION GAP SERPL CALC-SCNC: 13 MMOL/L — SIGNIFICANT CHANGE UP (ref 7–14)
APTT BLD: 38.9 SEC — HIGH (ref 27–36.3)
AST SERPL-CCNC: 28 U/L — SIGNIFICANT CHANGE UP (ref 4–32)
BASOPHILS # BLD AUTO: 0.03 K/UL — SIGNIFICANT CHANGE UP (ref 0–0.2)
BASOPHILS NFR BLD AUTO: 0.3 % — SIGNIFICANT CHANGE UP (ref 0–2)
BCA 255 TISS QL IMSTN: 15.4 U/ML — SIGNIFICANT CHANGE UP
BILIRUB SERPL-MCNC: 0.5 MG/DL — SIGNIFICANT CHANGE UP (ref 0.2–1.2)
BLD GP AB SCN SERPL QL: NEGATIVE — SIGNIFICANT CHANGE UP
BUN SERPL-MCNC: 16 MG/DL — SIGNIFICANT CHANGE UP (ref 7–23)
CALCIUM SERPL-MCNC: 9.3 MG/DL — SIGNIFICANT CHANGE UP (ref 8.4–10.5)
CANCER AG125 SERPL-ACNC: 8 U/ML — SIGNIFICANT CHANGE UP
CANCER AG19-9 SERPL-ACNC: 10 U/ML — SIGNIFICANT CHANGE UP
CEA SERPL-MCNC: 1.8 NG/ML — SIGNIFICANT CHANGE UP (ref 1–3.8)
CHLORIDE SERPL-SCNC: 104 MMOL/L — SIGNIFICANT CHANGE UP (ref 98–107)
CO2 SERPL-SCNC: 25 MMOL/L — SIGNIFICANT CHANGE UP (ref 22–31)
CREAT SERPL-MCNC: 0.56 MG/DL — SIGNIFICANT CHANGE UP (ref 0.5–1.3)
EGFR: 97 ML/MIN/1.73M2 — SIGNIFICANT CHANGE UP
EOSINOPHIL # BLD AUTO: 0.08 K/UL — SIGNIFICANT CHANGE UP (ref 0–0.5)
EOSINOPHIL NFR BLD AUTO: 0.9 % — SIGNIFICANT CHANGE UP (ref 0–6)
GLUCOSE SERPL-MCNC: 118 MG/DL — HIGH (ref 70–99)
HCT VFR BLD CALC: 33.7 % — LOW (ref 34.5–45)
HGB BLD-MCNC: 10.3 G/DL — LOW (ref 11.5–15.5)
IANC: 7.15 K/UL — SIGNIFICANT CHANGE UP (ref 1.8–7.4)
IMM GRANULOCYTES NFR BLD AUTO: 0.7 % — SIGNIFICANT CHANGE UP (ref 0–0.9)
INR BLD: 1.44 RATIO — HIGH (ref 0.88–1.16)
LYMPHOCYTES # BLD AUTO: 0.64 K/UL — LOW (ref 1–3.3)
LYMPHOCYTES # BLD AUTO: 7.5 % — LOW (ref 13–44)
MCHC RBC-ENTMCNC: 26.5 PG — LOW (ref 27–34)
MCHC RBC-ENTMCNC: 30.6 GM/DL — LOW (ref 32–36)
MCV RBC AUTO: 86.9 FL — SIGNIFICANT CHANGE UP (ref 80–100)
MONOCYTES # BLD AUTO: 0.62 K/UL — SIGNIFICANT CHANGE UP (ref 0–0.9)
MONOCYTES NFR BLD AUTO: 7.2 % — SIGNIFICANT CHANGE UP (ref 2–14)
NEUTROPHILS # BLD AUTO: 7.15 K/UL — SIGNIFICANT CHANGE UP (ref 1.8–7.4)
NEUTROPHILS NFR BLD AUTO: 83.4 % — HIGH (ref 43–77)
NRBC # BLD: 0 /100 WBCS — SIGNIFICANT CHANGE UP (ref 0–0)
NRBC # FLD: 0 K/UL — SIGNIFICANT CHANGE UP (ref 0–0)
PLATELET # BLD AUTO: 412 K/UL — HIGH (ref 150–400)
POTASSIUM SERPL-MCNC: 3.8 MMOL/L — SIGNIFICANT CHANGE UP (ref 3.5–5.3)
POTASSIUM SERPL-SCNC: 3.8 MMOL/L — SIGNIFICANT CHANGE UP (ref 3.5–5.3)
PROT SERPL-MCNC: 7.1 G/DL — SIGNIFICANT CHANGE UP (ref 6–8.3)
PROTHROM AB SERPL-ACNC: 16.8 SEC — HIGH (ref 10.5–13.4)
RBC # BLD: 3.88 M/UL — SIGNIFICANT CHANGE UP (ref 3.8–5.2)
RBC # FLD: 17.4 % — HIGH (ref 10.3–14.5)
RH IG SCN BLD-IMP: POSITIVE — SIGNIFICANT CHANGE UP
SODIUM SERPL-SCNC: 142 MMOL/L — SIGNIFICANT CHANGE UP (ref 135–145)
WBC # BLD: 8.58 K/UL — SIGNIFICANT CHANGE UP (ref 3.8–10.5)
WBC # FLD AUTO: 8.58 K/UL — SIGNIFICANT CHANGE UP (ref 3.8–10.5)

## 2023-05-15 PROCEDURE — 99285 EMERGENCY DEPT VISIT HI MDM: CPT | Mod: GC

## 2023-05-15 PROCEDURE — 71260 CT THORAX DX C+: CPT | Mod: 26,MA

## 2023-05-15 PROCEDURE — 99223 1ST HOSP IP/OBS HIGH 75: CPT | Mod: GC

## 2023-05-15 PROCEDURE — 74177 CT ABD & PELVIS W/CONTRAST: CPT | Mod: 26,MA

## 2023-05-15 RX ORDER — MORPHINE SULFATE 50 MG/1
2 CAPSULE, EXTENDED RELEASE ORAL EVERY 6 HOURS
Refills: 0 | Status: DISCONTINUED | OUTPATIENT
Start: 2023-05-15 | End: 2023-05-19

## 2023-05-15 RX ORDER — ACETAMINOPHEN 500 MG
650 TABLET ORAL EVERY 6 HOURS
Refills: 0 | Status: DISCONTINUED | OUTPATIENT
Start: 2023-05-15 | End: 2023-05-17

## 2023-05-15 RX ORDER — ACETAMINOPHEN 500 MG
975 TABLET ORAL ONCE
Refills: 0 | Status: COMPLETED | OUTPATIENT
Start: 2023-05-15 | End: 2023-05-15

## 2023-05-15 RX ORDER — ENOXAPARIN SODIUM 100 MG/ML
40 INJECTION SUBCUTANEOUS EVERY 12 HOURS
Refills: 0 | Status: DISCONTINUED | OUTPATIENT
Start: 2023-05-15 | End: 2023-05-16

## 2023-05-15 RX ORDER — SENNA PLUS 8.6 MG/1
1 TABLET ORAL AT BEDTIME
Refills: 0 | Status: DISCONTINUED | OUTPATIENT
Start: 2023-05-15 | End: 2023-05-23

## 2023-05-15 RX ORDER — IBUPROFEN 200 MG
600 TABLET ORAL EVERY 6 HOURS
Refills: 0 | Status: DISCONTINUED | OUTPATIENT
Start: 2023-05-15 | End: 2023-05-15

## 2023-05-15 RX ORDER — POLYETHYLENE GLYCOL 3350 17 G/17G
17 POWDER, FOR SOLUTION ORAL AT BEDTIME
Refills: 0 | Status: DISCONTINUED | OUTPATIENT
Start: 2023-05-15 | End: 2023-05-23

## 2023-05-15 RX ORDER — LIDOCAINE 4 G/100G
1 CREAM TOPICAL ONCE
Refills: 0 | Status: COMPLETED | OUTPATIENT
Start: 2023-05-15 | End: 2023-05-15

## 2023-05-15 RX ORDER — OXYCODONE HYDROCHLORIDE 5 MG/1
5 TABLET ORAL EVERY 6 HOURS
Refills: 0 | Status: DISCONTINUED | OUTPATIENT
Start: 2023-05-15 | End: 2023-05-16

## 2023-05-15 RX ORDER — CHOLECALCIFEROL (VITAMIN D3) 125 MCG
1 CAPSULE ORAL
Refills: 0 | DISCHARGE

## 2023-05-15 RX ORDER — KETOROLAC TROMETHAMINE 30 MG/ML
15 SYRINGE (ML) INJECTION ONCE
Refills: 0 | Status: DISCONTINUED | OUTPATIENT
Start: 2023-05-15 | End: 2023-05-15

## 2023-05-15 RX ORDER — CHOLECALCIFEROL (VITAMIN D3) 125 MCG
1000 CAPSULE ORAL DAILY
Refills: 0 | Status: DISCONTINUED | OUTPATIENT
Start: 2023-05-15 | End: 2023-05-23

## 2023-05-15 RX ORDER — PANTOPRAZOLE SODIUM 20 MG/1
40 TABLET, DELAYED RELEASE ORAL
Refills: 0 | Status: DISCONTINUED | OUTPATIENT
Start: 2023-05-15 | End: 2023-05-23

## 2023-05-15 RX ORDER — LEVOTHYROXINE SODIUM 125 MCG
75 TABLET ORAL DAILY
Refills: 0 | Status: DISCONTINUED | OUTPATIENT
Start: 2023-05-15 | End: 2023-05-23

## 2023-05-15 RX ORDER — PANTOPRAZOLE SODIUM 20 MG/1
1 TABLET, DELAYED RELEASE ORAL
Refills: 0 | DISCHARGE

## 2023-05-15 RX ORDER — SODIUM CHLORIDE 9 MG/ML
1000 INJECTION INTRAMUSCULAR; INTRAVENOUS; SUBCUTANEOUS ONCE
Refills: 0 | Status: COMPLETED | OUTPATIENT
Start: 2023-05-15 | End: 2023-05-15

## 2023-05-15 RX ADMIN — Medication 650 MILLIGRAM(S): at 18:26

## 2023-05-15 RX ADMIN — LIDOCAINE 1 PATCH: 4 CREAM TOPICAL at 19:40

## 2023-05-15 RX ADMIN — SENNA PLUS 1 TABLET(S): 8.6 TABLET ORAL at 21:13

## 2023-05-15 RX ADMIN — Medication 650 MILLIGRAM(S): at 17:49

## 2023-05-15 RX ADMIN — Medication 975 MILLIGRAM(S): at 11:19

## 2023-05-15 RX ADMIN — OXYCODONE HYDROCHLORIDE 5 MILLIGRAM(S): 5 TABLET ORAL at 22:13

## 2023-05-15 RX ADMIN — Medication 15 MILLIGRAM(S): at 10:49

## 2023-05-15 RX ADMIN — Medication 15 MILLIGRAM(S): at 11:19

## 2023-05-15 RX ADMIN — OXYCODONE HYDROCHLORIDE 5 MILLIGRAM(S): 5 TABLET ORAL at 21:13

## 2023-05-15 RX ADMIN — LIDOCAINE 1 PATCH: 4 CREAM TOPICAL at 10:49

## 2023-05-15 RX ADMIN — LIDOCAINE 1 PATCH: 4 CREAM TOPICAL at 22:11

## 2023-05-15 RX ADMIN — Medication 975 MILLIGRAM(S): at 10:49

## 2023-05-15 RX ADMIN — SODIUM CHLORIDE 1000 MILLILITER(S): 9 INJECTION INTRAMUSCULAR; INTRAVENOUS; SUBCUTANEOUS at 11:05

## 2023-05-15 NOTE — ED ADULT NURSE REASSESSMENT NOTE - NSFALLRISKASMT_ED_ALL_ED_DT
Critical access hospital Medicine  Progress Note    Patient name: Suma Butterfield  MRN: [id number]  Admit Date: 3/8/2023   LOS: 2 days     SUBJECTIVE:     Principal problem: NSTEMI (non-ST elevated myocardial infarction)    Interval History:  65-year-old lady with prior history of hypertension, SVT came with chest discomfort found to have NSTEMI and accelerated hypertension.  Started on heparin gtt. She developed left subconjunctival hemorrhage and thus LHC was was postponed until today.  LHC revealed severe disease in a small branch of RPL.  She will be on ASA and plavix for a year.  Inflammatory markers ordered to evaluate for myocarditis per Cardiology.    Hospital Course:    Scheduled Meds:   aspirin  81 mg Oral Daily    atorvastatin  40 mg Oral QHS    chlorhexidine  15 mL Mouth/Throat BID    clopidogreL  75 mg Oral Daily    famotidine  20 mg Oral BID    losartan  100 mg Oral Daily    metoprolol tartrate  50 mg Oral BID    mupirocin   Nasal BID    polyethylene glycol  17 g Oral BID     Continuous Infusions:   sodium chloride 0.9% Stopped (03/10/23 1825)     PRN Meds:acetaminophen, acetaminophen, glucagon (human recombinant), glucose, glucose, hydrALAZINE, HYDROcodone-acetaminophen, magnesium oxide, magnesium oxide, melatonin, morphine, naloxone, ondansetron, potassium bicarbonate, potassium bicarbonate, potassium bicarbonate, potassium, sodium phosphates, potassium, sodium phosphates, potassium, sodium phosphates, simethicone, sodium chloride 0.9%, sodium chloride 0.9%    Review of patient's allergies indicates:   Allergen Reactions    Succinylcholine      **Pseudocholinesterase**       Review of Systems: As per interval history    OBJECTIVE:     Vital Signs (Most Recent)  Temp: 98.9 °F (37.2 °C) (03/10/23 0701)  Pulse: 67 (03/10/23 1500)  Resp: (!) 27 (03/10/23 1500)  BP: (!) 108/58 (03/10/23 1450)  SpO2: 98 % (03/10/23 1500)    Vital Signs Range (Last 24H):  Temp:  [97.5 °F (36.4 °C)-98.9 °F (37.2 °C)] 
  Pulse:  [61-81]   Resp:  [14-35]   BP: ()/(52-72)   SpO2:  [92 %-98 %]     I & O (Last 24H):  Intake/Output Summary (Last 24 hours) at 3/10/2023 1830  Last data filed at 3/9/2023 2134  Gross per 24 hour   Intake 200 ml   Output --   Net 200 ml       Physical Exam:    Vitals and nursing note reviewed.     Constitutional:       General: Not in acute distress.     Appearance: Well-developed.   HENT:      Head: Normocephalic and atraumatic.   Eyes:      Pupils: Pupils are equal, round, and reactive to light. Left medial scleral hemorrhage  Cardiovascular:      Rate and Rhythm: Regular rhythm.   Pulmonary:      Effort: Pulmonary effort is normal.      Breath sounds: Normal breath sounds. No wheezing.   Abdominal:      General: There is no distension.      Palpations: Abdomen is soft.      Tenderness: There is no abdominal tenderness. There is no guarding or rebound.   Musculoskeletal:         General: Normal range of motion.      Cervical back: Normal range of motion.   Skin:     Findings: No rash.   Neurological:      Mental Status: Alert and oriented to person, place, and time.      Cranial Nerves: No cranial nerve deficit.      Sensory: No sensory deficit.     Laboratory:  I have reviewed all pertinent lab results within the past 24 hours.  CBC:   Recent Labs   Lab 03/10/23  0557   WBC 7.21   RBC 3.76*   HGB 14.0   HCT 39.5      *   MCH 37.2*   MCHC 35.4     CMP:   Recent Labs   Lab 03/10/23  0557   *   CALCIUM 9.2   ALBUMIN 4.1   PROT 7.0      K 3.8   CO2 22*      BUN 9   CREATININE 0.6   ALKPHOS 51*   ALT 58*   AST 41*   BILITOT 0.9     Cardiac markers: No results for input(s): CKMB, CPKMB, TROPONINT, TROPONINI, MYOGLOBIN in the last 168 hours.    Diagnostic Results:      ASSESSMENT/PLAN:         Active Hospital Problems    Diagnosis  POA    *NSTEMI (non-ST elevated myocardial infarction) [I21.4]  Yes    Subconjunctival hemorrhage of left eye [H11.32]  No    
Hypercholesterolemia [E78.00]  Yes     Chronic    Essential hypertension [I10]  Yes    SVT (supraventricular tachycardia) [I47.1]  Yes    Chronic hepatitis C [B18.2]  Yes      Resolved Hospital Problems   No resolved problems to display.         Plan:     Overall presentation consistent with NSTEMI, she was given loading dose of Plavix 300 mg, aspirin, was started on heparin GTT.  Thinks she developed left subconjunctival hemorrhage, heparin GTT was started, patient has absolutely no ocular symptoms.  Evaluated by MDs and reached out  to [facility] ophthalmology who recommended no further interventions and also recommended to continue heparin GTT and cleared her to go for cardiac catheterization  S/p [facility] with the above results.  ASA and plavix for one year.  ESR, CRP to eval for myocarditis   No present ocular deficiencies  Aspirin, low-dose beta-blocker(known history of SVT), losartan  telemetry monitoring  Cardiac diet  Further management as per clinical course         VTE Risk Mitigation (From admission, onward)      None              Department Hospital Medicine  [facility]  Tico Bush MD  Date of service: 03/10/2023     
15-May-2023 16:22
15-May-2023 17:53

## 2023-05-15 NOTE — ED PROCEDURE NOTE - PROCEDURE NAME, MLM
Point of Care Ultrasound Lung
Point of Care Ultrasound RUQ
Point of Care Ultrasound FAST
Point of Care Ultrasound Cardiac

## 2023-05-15 NOTE — CONSULT NOTE ADULT - ATTENDING COMMENTS
Pt seen, examined and d/w fellow.  Pt with several months f increased bon e/ back pain, then decreased appetite and 10 lb wt loss and generalized malaise and fatigue. Had scans done by physiatrist on presentation for PT and found to have lytic bone mets. Seen by arya Carcamo at [facility]- had a MM w/u neg. Referred in for pain control and further w/u. Pt reports int back spasms of pain with fair and transient control with OTC meds. PE sclerae anicteric EOMI pharynx unremarkable Lungs clear Heart RRR S1S2 B/L br w/o nipple retraction skin dimpling or palp masses. Abd NABS soft / NT, no HSM / masses .NEuro grossly nonfocal.    A/P Hs carcinoma of unknown primary presenting in bone. Met cancer with lytic changes on scans. MM r/o'd already. Bone bx with prelim "carcinoma" with special stains pending. Have d/w pt CUP and the need to wait for IHC stains to help determine , if possible, the organ of origin as that may influence best treatment options. Can also consider NGS studies if needed. Have noted that in some cases do not find th elikely organ of origin and then may need to use a generic regimen for CUP - for pal intent. PT understands the GOC are palliative and not curative. Will need admit for optimization of pain control in the meantime. Will follow with you.

## 2023-05-15 NOTE — ED ADULT NURSE NOTE - NSFALLUNIVINTERV_ED_ALL_ED
Bed/Stretcher in lowest position, wheels locked, appropriate side rails in place/Call bell, personal items and telephone in reach/Instruct patient to call for assistance before getting out of bed/chair/stretcher/Non-slip footwear applied when patient is off stretcher/[place] to call system/Physically safe environment - no spills, clutter or unnecessary equipment/Purposeful proactive rounding/Room/bathroom lighting operational, light cord in reach

## 2023-05-15 NOTE — H&P ADULT - PROBLEM SELECTOR PLAN 2
Pt endorses taking Tylenol & Advil alternating q3-4 hours at home to try and control her pain. Will start pain regimen & assess needs inpatient  - PO Tylenol for mild pain   - PO Motrin for moderate pain  - PO oxycodone for severe pain Noted on CT CAP on admission to have focal, large, nearly occlusive thrombus in distal thoracic aorta. Obtained cardiac surgery eval in ED as they manage thoracic aorta thrombotic dz (not vascular surgery)  - no acute intervention recommended by cardiac surgery, thrombus is chronic appearing  - no contraindication to AC per heme  - start Lovenox (therapeutic dosing)

## 2023-05-15 NOTE — ED PROVIDER NOTE - ATTENDING CONTRIBUTION TO CARE
GEN - NAD; nontoxic appearing  HEAD - NC/AT   EYES- PERRL, EOMI  ENT: Airway patent, very dry mm, Oral cavity and pharynx normal. No inflammation, swelling, exudate, or lesions.  NECK: Neck supple  PULMONARY - CTA b/l, symmetric breath sounds.   CARDIAC -s1s2, RRR, no M,G,R  ABDOMEN - +BS, ND, NT, soft, no guarding, no rebound, no masses   BACK - no CVA tenderness, Normal  no midline spinal ttp, no deformity, no stepoff, mild b/l lower paraspinal msk ttp, neg slr  EXTREMITIES - FROM, symmetric pulses, capillary refill < 2 seconds, no edema, no deformity  SKIN - no rash or bruising   NEUROLOGIC - alert, speech clear, no focal deficits, 5/5 strength b/l ue and le, silt  PSYCH -nl mood/affect, nl insight.  a/p-Agree with above noted HPI, in addition patient and family member at bedside notes that she has not had a good appetite in the last few weeks has not been eating and drinking much, does not have any pain in her abdomen chest does not have any fevers cough shortness of breath vomiting diarrhea dysuria melena or BRBPR.  No edema.  Reports her back pain is controlled with OTC medication and she is ambulatory without difficulty.  No bowel or bladder incontinence, no numbness tingling, does note occasional sensation of weakness to her legs.Patient also reports transfusion on Saturday for recurrent anemia for which she has been transfused before, her doctors have been working her up and have not yet found a source.  Patient sent in for admission for further malignancy work-up after MRI and pathology findings.  These results were discussed with patient and family prior to their arrival and reiterated again in ED.  Will touch base with heme-onc to further assess ultimate planning, pain control as needed admission for further work-up. We will also recheck lab work to assess degree of anemia status posttransfusion, evaluate for electrolyte disturbances and organ dysfunction in setting of dehydration and decreased appetite.  Patient and family are agreeable to work-up and admission.

## 2023-05-15 NOTE — CONSULT NOTE ADULT - ASSESSMENT
KRUNAL RUEDA is a 72y Female with a past medical history as described above who presented for evaluation after a biopsy of a lytic bone lesion revealed a carcinoma, oncology consulted for work-up of carcinoma of unknown primary.       # Carcinoma of Unknown Primary   - Was following up with hematologist Dr. Carcamo for MM work-up given anemia and lytic bone lesions, bone marrow biopsy and MM work-up was negative. Biopsy of a lytic lesion revealed carcinoma. No detectable disease on CT imaging, other than hepatomegaly and known aortic thrombus (says that CT was following and no need for intervention?)  - Follow-up final pathology, will discuss with pathology their impression from tissue thus far  - Follow-up markers, check thyroglobulin, beta-hCG, inhibin B  - Differential of CUP with lytic lesions includes occult breast, renal cell, thyroid, melanoma, germ cell, etc    Kyle Cuevas MD, PGY-4  Hematology/Medical Oncology Fellow  Pager: [phone]  Available on Microsoft Teams  After 5pm or on weekends please contact  to page on-call fellow  KRUNAL RUEDA is a 72y Female with a past medical history as described above who presented for evaluation after a biopsy of a lytic bone lesion revealed a carcinoma, oncology consulted for work-up of carcinoma of unknown primary.       # Carcinoma of Unknown Primary   - Was following up with hematologist Dr. Carcamo for MM work-up given anemia and lytic bone lesions, bone marrow biopsy and MM work-up was negative. Biopsy of a lytic lesion revealed carcinoma. No detectable disease on CT imaging, other than hepatomegaly and known aortic thrombus (says that CT was following and no need for intervention?)  - Follow-up final pathology, will discuss with pathology their impression from tissue thus far  - Follow-up markers, check thyroglobulin, beta-hCG, inhibin B  - Differential of CUP with lytic lesions includes occult breast, renal cell, thyroid, melanoma, germ cell, etc    # Aortic thrombus  - Recommend urgent vascular evaluation. No hematologic contraindication to starting AC.     Kyle Cuevas MD, PGY-4  Hematology/Medical Oncology Fellow  Pager: [phone]  Available on Microsoft Teams  After 5pm or on weekends please contact  to page on-call fellow

## 2023-05-15 NOTE — H&P ADULT - HISTORY OF PRESENT ILLNESS
Ms. Kiesha Rojas is a 72 year old female with PMH of hypothyroidism, vitiligo, psoriasis, GERD, presenting with low back pain & malignancy workup.    Pt reports pain in her low back & R side that started in January 2023 & became progressively worse over the past several months. She has been taking Tylenol & Advil around the clock to try and control her pain. She endorses associated poor appetite & weight loss of ~10 lbs. Remainder of ROS negative. Since April, pt has been noted to be intermittently anemic on outpatient testing requiring multiple transfused units of pRBCs (most recently Hgb 6.7 on 5/13, s/p 2u pRBC). Pt underwent PET CT which demonstrated FDG avid vertebral lytic lesions. Underwent bone biopsy of L2 lytic lesion which demonstrated poorly differentiated carcinoma on preliminary read. Pt instructed by her oncologist to visit ED for expedited malignancy workup.     Prior to January, pt was in her usual state of health & lived an active lifestyle. She is up-to-date on cancer screening, though her last colonoscopy was in 2013 and she was originally scheduled to undergo a colonoscopy today. No known history of cancer in first-degree relatives (she had a cousin who developed breast cancer). She is a never smoker and denies drinking/drug use.    ED: Afebrile. HR 60. BP range 145//67. SpO2 99% on RA. S/p 1L NS, IV Toradol 15 mg, PO Tylenol 975 mg. S/p CT CAP w/ IV contrast demonstrating focal nearly occlusive thrombus in distal descending aorta, splenic infarcts, hepatomegaly with heterogeneous liver enhancement, & multiple osseous lytic lesions in the thoracic & lumbar spine & pelvis.  Ms. Kiesha Rojas is a 72 year old female with PMH of hypothyroidism, vitiligo, psoriasis, GERD, presenting with low back pain & malignancy workup.    Pt reports pain in her low back & R side that started in January 2023 & became progressively worse over the past several months. She has been taking Tylenol & Advil around the clock to try and control her pain. She endorses associated poor appetite & weight loss of ~10 lbs. Remainder of ROS negative. Since April, pt has been noted to be intermittently anemic on outpatient testing requiring multiple transfused units of pRBCs (most recently Hgb 6.7 on 5/13, s/p 2u pRBC). Pt underwent PET CT which demonstrated FDG avid vertebral lytic lesions. Underwent bone biopsy of L2 lytic lesion which demonstrated poorly differentiated carcinoma on preliminary read. Pt instructed by her oncologist to visit ED for expedited malignancy workup.     Prior to January, pt was in her usual state of health & lived an active lifestyle. She is up-to-date on cancer screening, though her last colonoscopy was in 2013 and she was originally scheduled to undergo a colonoscopy today. No known history of cancer in first-degree relatives (she had a cousin who developed breast cancer). She is a never smoker and denies drinking/drug use.    ED: Afebrile. HR 60. BP range 145//67. SpO2 99% on RA. S/p 1L NS, IV Toradol 15 mg, PO Tylenol 975 mg. S/p CT CAP w/ IV contrast demonstrating focal nearly occlusive thrombus in distal thoracic descending aorta, splenic infarcts, hepatomegaly with heterogeneous liver enhancement, & multiple osseous lytic lesions in the thoracic & lumbar spine & pelvis.

## 2023-05-15 NOTE — ED ADULT NURSE REASSESSMENT NOTE - NS ED NURSE REASSESS COMMENT FT1
Pt complained of back pain level 5 out of 10. Pt stated she wanted to try the Tylenol first. MD made aware and medication given as ordered.
Pt resting in bed with son at bedside. PT denies any pain or discomfort, no headache, lightheadedness, nausea, vomiting, chest pain.

## 2023-05-15 NOTE — ED ADULT NURSE NOTE - OBJECTIVE STATEMENT
Received pt in room 10 A&O x4, Hx: of Thyroid disease, Cirrhosis. Came to the ED due primary care doctor sent her for pain management of the back pain and for future testing of the malignant lesion which was found in a recent MRI. Pt states she has lower back pain but is able to walk, sometimes will get pain into the upper thighs. Pt takes Tylenol and Advil at home for pain management. Last pain medication was Advil at 4:00 in the morning. Pt states pain right now is a 6 out of 10. Pt denies any fall or trauma to her back. Pt able to walk, denies any chest pain, SOB, headache, lightheadedness, dizziness, weakness, trouble urinating. Pt denies any blood in stool or urine. Last BM was this morning and pt states it was normal for her. Heart sounds are normal, Lung sounds are clear and unlabored. Abdomen soft, non-distended, + bowel sounds in all four quadrants. No edema to upper or lower extremities. + pules to upper and lower extremities. Safety Maintained.

## 2023-05-15 NOTE — H&P ADULT - NSHPREVIEWOFSYSTEMS_GEN_ALL_CORE
REVIEW OF SYSTEMS    General: +10 lb weight loss, +decreased PO intake. Denies fever, chills, fatigue  Skin: Denies rash, jaundice  Ophthalmologic: Denies vision changes  ENMT: Denies hearing changes, sore throat, neck stiffness or pain  Respiratory: Denies cough, shortness of breath  Cardiovascular: Denies chest pain, palpitations, dizziness, LE swelling  Gastrointestinal: Denies abd pain nausea/vomiting, diarrhea, constipation, bloody stool, black tarry stool  Genitourinary: Denies dysuria, hematuria, urinary frequency, incontinence  Musculoskeletal: +low back pain, +R hip pain  Neurological: Denies focal weakness, headache, dizziness, numbness/tingling  Psychiatric: Denies depressed mood  Hematology/Lymphatics: Denies easy bruising or bleeding

## 2023-05-15 NOTE — H&P ADULT - PROBLEM SELECTOR PLAN 3
Pt intermittently requiring transfusion over past 1-2 months (most recently, Hgb 6.7 on 5/13, s/p 2u pRBC)  - Hgb 10.7 on admission  - trend daily CBC  - transfuse Hgb < 7 Undergoing active malignancy workup, initial bone biopsy report from 5/5 notable for poorly differentiated carcinoma. Currently pending additional immunostaining per path. Labs notable for elevated ALP likely in setting of lytic lesions  - known metastatic dz with bone mets, unclear origin at this time  - f/u final path  - onc recs appreciated

## 2023-05-15 NOTE — PATIENT PROFILE ADULT - FALL HARM RISK - HARM RISK INTERVENTIONS

## 2023-05-15 NOTE — H&P ADULT - PROBLEM SELECTOR PLAN 1
Undergoing active malignancy workup, initial bone biopsy report from 5/5 notable for poorly differentiated carcinoma. Currently pending additional immunostaining per path. Labs notable for elevated ALP likely in setting of lytic lesions  - known metastatic dz with bone to mets, unclear origin at this time  - f/u final path  - onc recs appreciated Pt endorses taking Tylenol & Advil alternating q3-4 hours at home to try and control her pain. Will start pain regimen & assess need  - PO Tylenol for mild pain  - PO oxycodone 5 mg q6h prn for moderate pain  - IV morphine 2 mg q6h prn for severe pain

## 2023-05-15 NOTE — ED PROVIDER NOTE - PHYSICAL EXAMINATION
PHYSICAL EXAM:  CONSTITUTIONAL: Well appearing, awake, alert, oriented to person, place, time/situation and in no apparent distress.  HEAD: Atraumatic  EYES: Clear bilaterally, pupils equal, round and reactive to light.  ENMT: Airway patent, Nasal mucosa clear. Mouth with normal mucosa. Uvula is midline.   CARDIAC: Normal rate, regular rhythm. +S1/S2. No murmurs, rubs or gallops.  RESPIRATORY: Breathing unlabored. Breath sounds clear and equal bilaterally.  ABDOMEN:  Soft, nontender, nondistended. No rebound tenderness or guarding.  NEUROLOGICAL: Alert and oriented, no focal deficits, no motor or sensory deficits. CN2-12 intact. Sensation intact x4 extremities.  SKIN: Skin warm and dry. No evidence of rashes or lesions.  MSK: no paraspinal or spinal tenderness. no focal tenderness.

## 2023-05-15 NOTE — H&P ADULT - NSHPPHYSICALEXAM_GEN_ALL_CORE
T(C): 36.3 (05-15-23 @ 11:19), Max: 36.4 (05-15-23 @ 09:34)  HR: 63 (05-15-23 @ 11:19) (60 - 63)  BP: 145/70 (05-15-23 @ 11:19) (145/70 - 161/67)  RR: 16 (05-15-23 @ 11:19) (16 - 16)  SpO2: 100% (05-15-23 @ 11:19) (99% - 100%)    CONSTITUTIONAL: No apparent distress  EYES: PERRL, EOMI, sclera clear  ENMT: Oral mucosa with moist membranes  NECK: Supple & symmetric. No lymphadenopathy  RESP: Clear to auscultation bilaterally, non-labored appearance on room air  CV: Regular rate & rhythm, no murmurs or gallops. No peripheral edema  GI: Soft, non-tender, non-distended. Bowel sounds present. No organomegaly  MSK: Normal ROM. Mild lower paraspinal tenderness   SKIN: No rashes or ulcers noted; no subcutaneous nodules or induration palpable  NEURO: CNs intact, strength 5/5 in bilateral lower extremities all muscle groups tested. Sensation grossly intact CN II-XII intact; normal reflexes in upper and lower extremities, sensation intact in upper and lower extremities b/l to light touch   PSYCH: A&Ox4, pleasant, appropriate insight & recall

## 2023-05-15 NOTE — ED PROVIDER NOTE - PROGRESS NOTE DETAILS
Asha Phoenix MD (PGY-1 EM): left voicemail with Dr. jennings. [place] DO PGY-3.  MARCI Lion Espino - 72-year-old female with a recent biopsy Of the bone showing a poorly differentiated neoplasm presents with lower back pain.  No saddle anesthesia.  No urinary or fecal incontinence or retention.  Denies numbness or tingling in lower extremities.  On exam no spinal midline tenderness.  5 out of 5 motor strength distally and bilaterally. Additionally will eval for anemia as patient has recently received 2 units PRBCs.  Clinically patient appears dry and endorses dec PO intake. Ordered IVF.  Will discuss with onc team.  Dispo pending on onc recs. Patient feeling comfortable and reports improvement after fluids.  Radiology resident called to inform about finding of near occluded distal descending thoracic aorta secondary to calcifications, CT surgery consulted and recommend cardiac surgery consult with [facility] as they do not cover this area.  Discussed case with [facility] cardiac surgery at [phone] who are evaluating case and will return with recommendations.Patient fully informed of all results, reports she has had CAT scans in the past before that was never significant for these findings.  Has no chest pain or shortness of breath or any other symptoms at this time. Spoke with Dr. Sue from cardiac surgery, recommends no acute interventions at this time as appears primarily chronic, recommends continuing medical care as per team here for metastatic work-up and can follow-up as an outpatient.  Does not recommend any new medicines at this time.

## 2023-05-15 NOTE — ED ADULT NURSE REASSESSMENT NOTE - NSFALLUNIVINTERV_ED_ALL_ED
Bed/Stretcher in lowest position, wheels locked, appropriate side rails in place/Call bell, personal items and telephone in reach/Instruct patient to call for assistance before getting out of bed/chair/stretcher/Non-slip footwear applied when patient is off stretcher/[place] to call system/Physically safe environment - no spills, clutter or unnecessary equipment/Purposeful proactive rounding/Room/bathroom lighting operational, light cord in reach
Bed/Stretcher in lowest position, wheels locked, appropriate side rails in place/Call bell, personal items and telephone in reach/Instruct patient to call for assistance before getting out of bed/chair/stretcher/Non-slip footwear applied when patient is off stretcher/[place] to call system/Physically safe environment - no spills, clutter or unnecessary equipment/Purposeful proactive rounding/Room/bathroom lighting operational, light cord in reach

## 2023-05-15 NOTE — ED PROVIDER NOTE - CLINICAL SUMMARY MEDICAL DECISION MAKING FREE TEXT BOX
72-year-old female with a recent MRI and bone biopsy on May 5, 2023 indicating malignant poorly differentiated neoplasm presents to the emergency department as requested by Dr. Vargas for further work-up of cancer and pain management. PE as documented. Will speak with Dr. vargas, will transfuse as needed, will treat pain, will order screening labs for admission

## 2023-05-15 NOTE — H&P ADULT - NSHPSOCIALHISTORY_GEN_ALL_CORE
Lives at home, independent in all ADLs. Retired, formerly worked in a gardening center. Never smoker, no significant hx of alcohol or drug   In addition to her prescribed medications & OTC pain medications, pt endorses taking various mushroom extracts over the past few months to boost her immune system including coriolus extract & maitake gold extract.

## 2023-05-15 NOTE — ED ADULT TRIAGE NOTE - CHIEF COMPLAINT QUOTE
pt wheeled into triage by son, sent in by doctor for back pain hx of lesion on L2 pt had MRI and bone biopsy pending results.

## 2023-05-15 NOTE — ED PROVIDER NOTE - OBJECTIVE STATEMENT
72-year-old female with a recent MRI and bone biopsy on May 5, 2023 indicating malignant poorly differentiated neoplasm presents to the emergency department as requested by Dr. Vargas for further work-up of cancer and pain management.  Patient states she has been having back pain for the last few months.  Patient denies any stool or urinary incontinence, urinary retention.  Patient denies any numbness or tingling to the lower extremities.  Patient denies any systemic signs or symptoms.  Patient states she has received a blood transfusion in the past in April when her hemoglobin was 7, patient states she received an additional 2 units of blood on Saturday, 5/13/2023.  Patient denies any hemoptysis, hematochezia, melena, hematuria.  Patient states she is aware of bone biopsy results.

## 2023-05-15 NOTE — H&P ADULT - ASSESSMENT
73 y/o female with pmh of newly diagnosed malignant poorly differentiated neoplasm (of unclear source on bone bx 5/5), Hypothyroidism, and Psoriasis who presents for bone pain and need for further work up of primary source of neoplasm. Ms. Kiesha Rojas is a 72 year old female with PMH of newly diagnosed poorly differentiated carcinoma of unclear origin, hypothyroidism, psoriasis, admitted for further workup of new cancer & management of bone pain. Noted to have large non-occlusive distal aortic thrombus on CT.  Ms. Kiesha Rojas is a 72 year old female with PMH of newly diagnosed poorly differentiated carcinoma of unclear origin, hypothyroidism, psoriasis, admitted for further workup of new cancer & management of bone pain. Noted to have large non-occlusive distal thoracic aortic thrombus on CT.

## 2023-05-15 NOTE — ED PROCEDURE NOTE - US POC STATEMENT
The patient/family was/were informed of limited nature of the exam. Representative images were printed to be scanned into the chart or directly uploaded into the medical record.

## 2023-05-15 NOTE — H&P ADULT - NSHPLABSRESULTS_GEN_ALL_CORE
10.3   8.58  )-----------( 412      ( 15 May 2023 10:55 )             33.7       05-15    142  |  104  |  16  ----------------------------<  118<H>  3.8   |  25  |  0.56    Ca    9.3      15 May 2023 10:55    TPro  7.1  /  Alb  3.4  /  TBili  0.5  /  DBili  x   /  AST  28  /  ALT  41<H>  /  AlkPhos  370<H>  05-15                  PT/INR - ( 15 May 2023 10:55 )   PT: 16.8 sec;   INR: 1.44 ratio         PTT - ( 15 May 2023 10:55 )  PTT:38.9 sec    CT Chest, Abdomen, and Pelvis with IV contrast: < from: CT Abdomen and Pelvis w/ IV Cont (05.15.23 @ 11:43) >    Focal large nearly occlusive thrombus in the distal descending aorta.    Splenic infarcts.    Hepatomegaly and nonspecific perihepatic and pericholecystic fluid.   Heterogeneous enhancement of the liver and nonopacification of the   hepatic veins, which may be related to early contrast timing. Consider   duplex ultrasound of the liver to evaluate to exclude hepatic vein   thrombus.    Multiple osseous lytic lesions in the spine and pelvis, concerning for   metastases.    < end of copied text > LABS:                        10.3   8.58  )-----------( 412      ( 15 May 2023 10:55 )             33.7     05-15    142  |  104  |  16  ----------------------------<  118<H>  3.8   |  25  |  0.56    Ca    9.3      15 May 2023 10:55    TPro  7.1  /  Alb  3.4  /  TBili  0.5  /  DBili  x   /  AST  28  /  ALT  41<H>  /  AlkPhos  370<H>  05-15        PT/INR - ( 15 May 2023 10:55 )   PT: 16.8 sec;   INR: 1.44 ratio       PTT - ( 15 May 2023 10:55 )  PTT:38.9 sec      IMAGING:  Reviewed.    < from: CT Abdomen and Pelvis w/ IV Cont (05.15.23 @ 11:43) >  Focal large nearly occlusive thrombus in the distal descending aorta.    Splenic infarcts.    Hepatomegaly and nonspecific perihepatic and pericholecystic fluid.   Heterogeneous enhancement of the liver and nonopacification of the   hepatic veins, which may be related to early contrast timing. Consider   duplex ultrasound of the liver to evaluate to exclude hepatic vein   thrombus.    Multiple osseous lytic lesions in the spine and pelvis, concerning for   metastases.  < end of copied text >

## 2023-05-15 NOTE — H&P ADULT - PROBLEM SELECTOR PLAN 4
Noted on CT CAP on admission to have focal, large, nearly occlusive thrombus in distal descending aorta.   - evaluated by cardiac surgery in ED who recommend no acute intervention; thrombus is chronic appearing Pt intermittently requiring transfusion over past 1-2 months (most recently, Hgb 6.7 on 5/13, s/p 2u pRBC)  - Hgb 10.7 on admission  - trend daily CBC  - transfuse Hgb < 7

## 2023-05-15 NOTE — ED PROCEDURE NOTE - ATTENDING CONTRIBUTION TO CARE
educational study with definitive testing to follow
Educational study with followup definitive studies performed.
educational us with definitive study to follow
educational us performed by resident with definitive study to follow

## 2023-05-15 NOTE — H&P ADULT - ATTENDING COMMENTS
72 year old in midst of outpatient oncological workup (prior records reviewed, several months of weight loss, back pain, found to have lytic lesions concerning for malignancy, MM ruled out, concern for bone metastases with unknown primary), admitted with worsening LBP, found to have focal large nearly occlusive thrombus in the distal descending aorta.    Pain: started on PRN pain regimen with bowel regimen.  Monitor for response    Thrombus:  will start lovenox, Vascular Sx and Vascular Cardiology to see    Metastatic Disease with unknown Primary: O/p biopsy results pending, likely o/p follow-up for further workup/treatment    Anemia: s/p PRBCs as outpatient. Monitor

## 2023-05-16 DIAGNOSIS — J96.01 ACUTE RESPIRATORY FAILURE WITH HYPOXIA: ICD-10-CM

## 2023-05-16 LAB
ALBUMIN SERPL ELPH-MCNC: 3.2 G/DL — LOW (ref 3.3–5)
ALP SERPL-CCNC: 341 U/L — HIGH (ref 40–120)
ALT FLD-CCNC: 31 U/L — SIGNIFICANT CHANGE UP (ref 4–33)
ANION GAP SERPL CALC-SCNC: 15 MMOL/L — HIGH (ref 7–14)
AST SERPL-CCNC: 15 U/L — SIGNIFICANT CHANGE UP (ref 4–32)
BASOPHILS # BLD AUTO: 0.02 K/UL — SIGNIFICANT CHANGE UP (ref 0–0.2)
BASOPHILS NFR BLD AUTO: 0.2 % — SIGNIFICANT CHANGE UP (ref 0–2)
BILIRUB SERPL-MCNC: 0.6 MG/DL — SIGNIFICANT CHANGE UP (ref 0.2–1.2)
BUN SERPL-MCNC: 12 MG/DL — SIGNIFICANT CHANGE UP (ref 7–23)
CALCIUM SERPL-MCNC: 9.1 MG/DL — SIGNIFICANT CHANGE UP (ref 8.4–10.5)
CANCER AG27-29 SERPL-ACNC: 17.4 U/ML — SIGNIFICANT CHANGE UP (ref 0–38.6)
CHLORIDE SERPL-SCNC: 102 MMOL/L — SIGNIFICANT CHANGE UP (ref 98–107)
CO2 SERPL-SCNC: 23 MMOL/L — SIGNIFICANT CHANGE UP (ref 22–31)
CREAT SERPL-MCNC: 0.56 MG/DL — SIGNIFICANT CHANGE UP (ref 0.5–1.3)
EGFR: 97 ML/MIN/1.73M2 — SIGNIFICANT CHANGE UP
EOSINOPHIL # BLD AUTO: 0.03 K/UL — SIGNIFICANT CHANGE UP (ref 0–0.5)
EOSINOPHIL NFR BLD AUTO: 0.4 % — SIGNIFICANT CHANGE UP (ref 0–6)
GLUCOSE SERPL-MCNC: 94 MG/DL — SIGNIFICANT CHANGE UP (ref 70–99)
HCG-TM SERPL-ACNC: 1 MIU/ML — SIGNIFICANT CHANGE UP
HCT VFR BLD CALC: 31.7 % — LOW (ref 34.5–45)
HCV AB S/CO SERPL IA: 0.09 S/CO — SIGNIFICANT CHANGE UP (ref 0–0.99)
HCV AB SERPL-IMP: SIGNIFICANT CHANGE UP
HGB BLD-MCNC: 9.8 G/DL — LOW (ref 11.5–15.5)
IANC: 6.83 K/UL — SIGNIFICANT CHANGE UP (ref 1.8–7.4)
IMM GRANULOCYTES NFR BLD AUTO: 0.7 % — SIGNIFICANT CHANGE UP (ref 0–0.9)
LYMPHOCYTES # BLD AUTO: 0.61 K/UL — LOW (ref 1–3.3)
LYMPHOCYTES # BLD AUTO: 7.5 % — LOW (ref 13–44)
MAGNESIUM SERPL-MCNC: 1.5 MG/DL — LOW (ref 1.6–2.6)
MCHC RBC-ENTMCNC: 26.6 PG — LOW (ref 27–34)
MCHC RBC-ENTMCNC: 30.9 GM/DL — LOW (ref 32–36)
MCV RBC AUTO: 86.1 FL — SIGNIFICANT CHANGE UP (ref 80–100)
MONOCYTES # BLD AUTO: 0.58 K/UL — SIGNIFICANT CHANGE UP (ref 0–0.9)
MONOCYTES NFR BLD AUTO: 7.1 % — SIGNIFICANT CHANGE UP (ref 2–14)
NEUTROPHILS # BLD AUTO: 6.83 K/UL — SIGNIFICANT CHANGE UP (ref 1.8–7.4)
NEUTROPHILS NFR BLD AUTO: 84.1 % — HIGH (ref 43–77)
NRBC # BLD: 0 /100 WBCS — SIGNIFICANT CHANGE UP (ref 0–0)
NRBC # FLD: 0 K/UL — SIGNIFICANT CHANGE UP (ref 0–0)
PHOSPHATE SERPL-MCNC: 2.7 MG/DL — SIGNIFICANT CHANGE UP (ref 2.5–4.5)
PLATELET # BLD AUTO: 426 K/UL — HIGH (ref 150–400)
POTASSIUM SERPL-MCNC: 3.6 MMOL/L — SIGNIFICANT CHANGE UP (ref 3.5–5.3)
POTASSIUM SERPL-SCNC: 3.6 MMOL/L — SIGNIFICANT CHANGE UP (ref 3.5–5.3)
PROT SERPL-MCNC: 6.9 G/DL — SIGNIFICANT CHANGE UP (ref 6–8.3)
RBC # BLD: 3.68 M/UL — LOW (ref 3.8–5.2)
RBC # FLD: 17.3 % — HIGH (ref 10.3–14.5)
SODIUM SERPL-SCNC: 140 MMOL/L — SIGNIFICANT CHANGE UP (ref 135–145)
TSH SERPL-MCNC: 3.65 UIU/ML — SIGNIFICANT CHANGE UP (ref 0.27–4.2)
WBC # BLD: 8.13 K/UL — SIGNIFICANT CHANGE UP (ref 3.8–10.5)
WBC # FLD AUTO: 8.13 K/UL — SIGNIFICANT CHANGE UP (ref 3.8–10.5)

## 2023-05-16 PROCEDURE — 99223 1ST HOSP IP/OBS HIGH 75: CPT

## 2023-05-16 PROCEDURE — 99233 SBSQ HOSP IP/OBS HIGH 50: CPT | Mod: GC

## 2023-05-16 PROCEDURE — 70450 CT HEAD/BRAIN W/O DYE: CPT | Mod: 26

## 2023-05-16 RX ORDER — MAGNESIUM SULFATE 500 MG/ML
2 VIAL (ML) INJECTION ONCE
Refills: 0 | Status: COMPLETED | OUTPATIENT
Start: 2023-05-16 | End: 2023-05-16

## 2023-05-16 RX ORDER — ENOXAPARIN SODIUM 100 MG/ML
40 INJECTION SUBCUTANEOUS EVERY 12 HOURS
Refills: 0 | Status: DISCONTINUED | OUTPATIENT
Start: 2023-05-16 | End: 2023-05-17

## 2023-05-16 RX ORDER — LANOLIN ALCOHOL/MO/W.PET/CERES
3 CREAM (GRAM) TOPICAL AT BEDTIME
Refills: 0 | Status: DISCONTINUED | OUTPATIENT
Start: 2023-05-16 | End: 2023-05-23

## 2023-05-16 RX ORDER — KETOROLAC TROMETHAMINE 30 MG/ML
10 SYRINGE (ML) INJECTION EVERY 6 HOURS
Refills: 0 | Status: DISCONTINUED | OUTPATIENT
Start: 2023-05-16 | End: 2023-05-19

## 2023-05-16 RX ADMIN — PANTOPRAZOLE SODIUM 40 MILLIGRAM(S): 20 TABLET, DELAYED RELEASE ORAL at 06:20

## 2023-05-16 RX ADMIN — Medication 650 MILLIGRAM(S): at 10:22

## 2023-05-16 RX ADMIN — ENOXAPARIN SODIUM 40 MILLIGRAM(S): 100 INJECTION SUBCUTANEOUS at 05:30

## 2023-05-16 RX ADMIN — Medication 1000 UNIT(S): at 12:14

## 2023-05-16 RX ADMIN — Medication 75 MICROGRAM(S): at 05:32

## 2023-05-16 RX ADMIN — Medication 25 GRAM(S): at 09:23

## 2023-05-16 RX ADMIN — POLYETHYLENE GLYCOL 3350 17 GRAM(S): 17 POWDER, FOR SOLUTION ORAL at 22:05

## 2023-05-16 RX ADMIN — Medication 3 MILLIGRAM(S): at 23:04

## 2023-05-16 RX ADMIN — SENNA PLUS 1 TABLET(S): 8.6 TABLET ORAL at 22:07

## 2023-05-16 RX ADMIN — Medication 650 MILLIGRAM(S): at 22:06

## 2023-05-16 RX ADMIN — OXYCODONE HYDROCHLORIDE 5 MILLIGRAM(S): 5 TABLET ORAL at 03:18

## 2023-05-16 RX ADMIN — Medication 650 MILLIGRAM(S): at 22:45

## 2023-05-16 RX ADMIN — Medication 650 MILLIGRAM(S): at 09:22

## 2023-05-16 RX ADMIN — OXYCODONE HYDROCHLORIDE 5 MILLIGRAM(S): 5 TABLET ORAL at 04:18

## 2023-05-16 NOTE — DIETITIAN INITIAL EVALUATION ADULT - PERTINENT LABORATORY DATA
(5/16) Na 140, BUN 12, Cr 0.56, BG 94, K+ 3.6, Phos 2.7, Mg 1.50<L>, Alk Phos 341<H>, ALT/SGPT 31, AST/SGOT 15

## 2023-05-16 NOTE — PROGRESS NOTE ADULT - PROBLEM SELECTOR PLAN 1
Noted desat to 80% on RA overnight, placed on 6L NC with improvement. Since weaned to 2L sating 97-99%  - possibly in setting of pain vs atelectasis (noted bibasilar atelectasis on CT)  - incentive spirometry  - pain control  - wean O2 as tolerated

## 2023-05-16 NOTE — CHART NOTE - NSCHARTNOTEFT_GEN_A_CORE
Called by RN for new O2 requirements. RN reports desat to 80 after awakening pt for routine vitals. Reports sustained with good pleth. Pt denies symptoms. RN started O2 titrating to 6L for O2% 94. Pt seen and examined. Denies sob, increased wob, cough, congestion, PND, orthopnea, cp. No hx copd, olegario. non smoker. No symptoms and would not have known about hypoxia if not measured. Weaned to 3L NC, then 1L NC, then RA however desat to 89 after a few minutes on RA with good pleth, again asymptomatic. Thereafter maintained good sats with 2L NC. Pt here for dorsalgia and notes may be taking short breaths due to pain. Incentive spirometer ordered, RN aware. Defer pain control to day team to arrive shortly.

## 2023-05-16 NOTE — DIETITIAN INITIAL EVALUATION ADULT - ADD RECOMMEND
Recommend continue regular Kosher diet.   RDN to provide [employer]gain Vegan shake 1 PO 2x daily (provides 220 kcal, 16 gm protein per 11oz serving).  Discussed menu ordering system. Pt made aware RDN remains available to obtain food preferences PRN.  Obtain weekly weights.  Monitor electrolytes (K+, Mg, P) and replete to within desired limits as clinically indicated.

## 2023-05-16 NOTE — PROGRESS NOTE ADULT - PROBLEM SELECTOR PLAN 4
Undergoing active malignancy workup, initial bone biopsy report from 5/5 notable for poorly differentiated carcinoma. Currently pending additional immunostaining per path. Labs notable for elevated ALP likely in setting of lytic lesions  - known metastatic dz with bone mets, unclear origin at this time  - f/u final path  - onc recs appreciated

## 2023-05-16 NOTE — PROGRESS NOTE ADULT - PROBLEM SELECTOR PLAN 3
Noted on CT CAP on admission to have focal, large, nearly occlusive thrombus in distal thoracic aorta. Obtained cardiac surgery eval in ED as they manage thoracic aorta thrombotic dz (not vascular surgery)  - no acute intervention recommended by cardiac surgery, thrombus is chronic appearing  - no contraindication to AC per heme  - started Lovenox (therapeutic dosing) Noted on CT CAP on admission to have focal, large, nearly occlusive thrombus in distal thoracic aorta. Obtained cardiac surgery eval in ED as they manage thoracic aorta thrombotic dz (not vascular surgery)  - no acute intervention recommended by cardiac surgery, thrombus is chronic appearing  - no contraindication to AC per heme  - started Lovenox (therapeutic dosing)  - pt noted bitemporal HA this am which has since resolved. Given the timing of new HA after starting AC, will obtain head CT and potentially resume AC pending CT read

## 2023-05-16 NOTE — DIETITIAN INITIAL EVALUATION ADULT - OTHER CALCULATIONS
Pt reports weight 94 lbs PTA with usual body weight 105 lbs in January.   Dosing weight (4/15) 106.9 lbs / 48.5 kg, likely inaccurate. Recent chart weights (3/23) 95 lbs, (1/18) 105 lbs.   Apparent ~11 lb (10%) weight loss x<6 months, clinically significant.  Ideal Body Weight: 98 lbs / 44.5 kg +/-10%

## 2023-05-16 NOTE — PROGRESS NOTE ADULT - SUBJECTIVE AND OBJECTIVE BOX
DATE OF SERVICE: 05-16-23 @ 08:47    Patient is a 72y old  Female who presents with a chief complaint of Malignancy workup (15 May 2023 16:03)      SUBJECTIVE / OVERNIGHT EVENTS:  Noted to desat to 80% on RA around 5:30 am, no apparent distress. Placed on 6L with improvement in SpO2 -> weaned to 2L, sating %.  AFebrile, HDS  ***    MEDICATIONS  (STANDING):  cholecalciferol 1000 Unit(s) Oral daily  enoxaparin Injectable 40 milliGRAM(s) SubCutaneous every 12 hours  levothyroxine 75 MICROGram(s) Oral daily  pantoprazole    Tablet 40 milliGRAM(s) Oral before breakfast  polyethylene glycol 3350 17 Gram(s) Oral at bedtime  senna 1 Tablet(s) Oral at bedtime    MEDICATIONS  (PRN):  acetaminophen     Tablet .. 650 milliGRAM(s) Oral every 6 hours PRN Mild Pain (1 - 3)  morphine  - Injectable 2 milliGRAM(s) IV Push every 6 hours PRN Severe Pain (7 - 10)  oxyCODONE    IR 5 milliGRAM(s) Oral every 6 hours PRN Moderate Pain (4 - 6)      Vital Signs Last 24 Hrs  T(C): 37.2 (16 May 2023 05:40), Max: 37.2 (16 May 2023 05:29)  T(F): 98.9 (16 May 2023 05:40), Max: 98.9 (16 May 2023 05:29)  HR: 65 (16 May 2023 05:40) (60 - 65)  BP: 160/69 (16 May 2023 05:40) (145/70 - 161/73)  BP(mean): --  RR: 17 (16 May 2023 05:40) (14 - 17)  SpO2: 97% (16 May 2023 05:40) (97% - 100%)    Parameters below as of 16 May 2023 05:40  Patient On (Oxygen Delivery Method): nasal cannula  O2 Flow (L/min): 2      PHYSICAL EXAM:  GENERAL: No apparent distress  HEAD:  Atraumatic, normocephalic  EYES: EOMI, PERRLA, conjunctiva and sclera clear  NECK: Supple, no JVD  CHEST/LUNG: Clear to auscultation bilaterally; non-labored respirations on 2L NC  HEART: Regular rate and rhythm; No murmurs, rubs, or gallops  ABDOMEN: Soft, nontender, nondistended; Bowel sounds present  EXTREMITIES:  2+ peripheral pulses; No clubbing, cyanosis, or edema  PSYCH: AAOx3  NEUROLOGY: No focal deficits  SKIN: No rashes or lesions      LABS:                        9.8    8.13  )-----------( 426      ( 16 May 2023 07:15 )             31.7     ***    RADIOLOGY & ADDITIONAL TESTS:  Reviewed   DATE OF SERVICE: 05-16-23 @ 08:47    Patient is a 72y old  Female who presents with a chief complaint of Malignancy workup (15 May 2023 16:03)      SUBJECTIVE / OVERNIGHT EVENTS:  Noted to desat to 80% on RA around 5:30 am, no apparent distress. Placed on 6L with improvement in SpO2 -> weaned to 2L, sating %.  AFebrile, HDS  Pt reports abd discomfort after taking oxycodone overnight. No nausea/vomiting/diarrhea, no bowel movements overnight. Pt denies SOB or cough, even when noted to be hypoxic on RA.     MEDICATIONS  (STANDING):  cholecalciferol 1000 Unit(s) Oral daily  enoxaparin Injectable 40 milliGRAM(s) SubCutaneous every 12 hours  levothyroxine 75 MICROGram(s) Oral daily  pantoprazole    Tablet 40 milliGRAM(s) Oral before breakfast  polyethylene glycol 3350 17 Gram(s) Oral at bedtime  senna 1 Tablet(s) Oral at bedtime    MEDICATIONS  (PRN):  acetaminophen     Tablet .. 650 milliGRAM(s) Oral every 6 hours PRN Mild Pain (1 - 3)  morphine  - Injectable 2 milliGRAM(s) IV Push every 6 hours PRN Severe Pain (7 - 10)  oxyCODONE    IR 5 milliGRAM(s) Oral every 6 hours PRN Moderate Pain (4 - 6)      Vital Signs Last 24 Hrs  T(C): 37.2 (16 May 2023 05:40), Max: 37.2 (16 May 2023 05:29)  T(F): 98.9 (16 May 2023 05:40), Max: 98.9 (16 May 2023 05:29)  HR: 65 (16 May 2023 05:40) (60 - 65)  BP: 160/69 (16 May 2023 05:40) (145/70 - 161/73)  BP(mean): --  RR: 17 (16 May 2023 05:40) (14 - 17)  SpO2: 97% (16 May 2023 05:40) (97% - 100%)    Parameters below as of 16 May 2023 05:40  Patient On (Oxygen Delivery Method): nasal cannula  O2 Flow (L/min): 2      PHYSICAL EXAM:  GENERAL: No apparent distress  HEAD:  Atraumatic, normocephalic  EYES: EOMI, PERRLA, conjunctiva and sclera clear  NECK: Supple, no JVD  CHEST/LUNG: Clear to auscultation bilaterally; non-labored respirations on 2L NC  HEART: Regular rate and rhythm; No murmurs, rubs, or gallops  ABDOMEN: Soft, nontender, nondistended; Bowel sounds present  EXTREMITIES:  2+ peripheral pulses; No clubbing, cyanosis, or edema  PSYCH: AAOx3  NEUROLOGY: No focal deficits  SKIN: No rashes or lesions      LABS:                        9.8    8.13  )-----------( 426      ( 16 May 2023 07:15 )             31.7     05-16    140  |  102  |  12  ----------------------------<  94  3.6   |  23  |  0.56    Ca    9.1      16 May 2023 07:15  Phos  2.7     05-16  Mg     1.50     05-16    TPro  6.9  /  Alb  3.2<L>  /  TBili  0.6  /  DBili  x   /  AST  15  /  ALT  31  /  AlkPhos  341<H>  05-16      RADIOLOGY & ADDITIONAL TESTS:  Reviewed

## 2023-05-16 NOTE — DIETITIAN NUTRITION RISK NOTIFICATION - ADDITIONAL COMMENTS/DIETITIAN RECOMMENDATIONS
Please see Dietitian Initial Assessment for complete recommendations.  Lotus Wick, JONATHON, CDN #[id number]  Also available on Microsoft Teams

## 2023-05-16 NOTE — DIETITIAN INITIAL EVALUATION ADULT - OTHER INFO
Per chart, pt is 72 year old female PMH newly diagnosed poorly differentiated carcinoma of unclear origin, hypothyroidism, psoriasis admitted for further workup of new cancer and management of bone pain. Noted to have large non-occlusive distal thoracic aortic thrombus on CT. Heme/Onc on board.    Pt confirms NKFA, denies difficulties chewing/swallowing. Pt lives at home, son assists with preparation of meals. Pt consumes Kosher diet. Pt reports decreased appetite/PO intake since January. Pt has historically tried nutrition supplements however dislikes as she finds them to be sweet. Per chart, pt has been using mushroom extracts PTA to boost immune system.     Pt on NC at time of visit. Pt with poor PO intake thus far in house. Pt amenable to trial of Orgain shake. No current GI distress noted. Ordered for senna 1 tablet qHS and Miralax 17 gm qD. Labs notable for hypomagnesemia.

## 2023-05-16 NOTE — CONSULT NOTE ADULT - ASSESSMENT
Ms. Kiesha Rojas is a 72 year old woman with hypothyroidism, vitiligo, psoriasis, and GERD.    She started to note low back & Right-sided pain in January 2023.  PET-CT scan demonstrated FDG-avid vertebral lytic lesions.   Underwent bone biopsy of L2 lytic lesion which demonstrated poorly differentiated carcinoma.  She was instructed by her oncologist to visit our ED for expedited malignancy workup and for management of bone pain.     CT chest and abdomen/pelvis identified focal, nearly occlusive thrombus in distal thoracic descending aorta, with splenic infarcts, hepatomegaly with heterogeneous liver enhancement, and multiple osseous lytic lesions in the thoracic, lumbar spine, and pelvis.      At the time of my evaluation this morning, the patient appeared clinically and hemodynamically stable.  She was reporting having headaches without neurologic deficits  NAD, thin in appearance  Appeared euvolemic  No LE edema    Impression:  1. Cancer-associated arterial thrombosis (focal, nearly occlusive thrombus in distal thoracic descending aorta).  2. Anemia, likely from bone marrow suppression. Denies active bleeding.  3. Headaches    Recommendations:  1. If no active contraindications, would recommend initiating anticoagulation.  Enoxaparin at 1 mg/kg BID is the preferred agent at this time based on its shorter half-life.  2. Prior to initiating anticoagulation, would evaluate with head CT to rule out intracranial metastasis.  3. Defer to CT surgery for evaluation of possible invasive intervention of arterial thrombosis.  In this patient with advanced co-morbid conditions include metastatic cancer, anticipate they will recommend conservative medical management as risks of surgery will outweigh benefits.  4. Ongoing malignancy workup with known metastatic involvement of bone (unclear origin at this time).

## 2023-05-16 NOTE — PROGRESS NOTE ADULT - SUBJECTIVE AND OBJECTIVE BOX
KRUNAL RUEDA 72y Female      Patient is a 72y old  Female who presents with a chief complaint of Metastatic malignant neoplasm of unknown primary site     (16 May 2023 10:56)        INTERVAL HPI/OVERNIGHT EVENTS: No acute events overnight. Patient was seen and evaluated at the bedside. The patient denies pain. Vitals stable. Patient denies fever/chills, chest pain, shortness of breath, abdominal pain, headaches, nausea/vomiting, and diarrhea/constipation.      PHYSICAL EXAM:  GENERAL: NAD  HEAD:  Normocephalic  EYES:  conjunctiva and sclera clear  ENMT: Moist mucous membranes  NECK: Supple  NERVOUS SYSTEM:  Alert, awake  CHEST/LUNG: Good air exchange bilaterally, no wheeze  HEART: Regular rate and rhythm        Vital Signs Last 24 Hrs  T(C): 37.2 (16 May 2023 13:45), Max: 37.2 (16 May 2023 05:29)  T(F): 99 (16 May 2023 13:45), Max: 99 (16 May 2023 13:45)  HR: 63 (16 May 2023 13:45) (62 - 65)  BP: 170/65 (16 May 2023 13:45) (160/69 - 170/65)  BP(mean): --  RR: 16 (16 May 2023 13:45) (16 - 17)  SpO2: 96% (16 May 2023 13:45) (96% - 99%)    Parameters below as of 16 May 2023 13:45  Patient On (Oxygen Delivery Method): nasal cannula  O2 Flow (L/min): 2        MEDICATIONS  (STANDING):  cholecalciferol 1000 Unit(s) Oral daily  enoxaparin Injectable 40 milliGRAM(s) SubCutaneous every 12 hours  levothyroxine 75 MICROGram(s) Oral daily  pantoprazole    Tablet 40 milliGRAM(s) Oral before breakfast  polyethylene glycol 3350 17 Gram(s) Oral at bedtime  senna 1 Tablet(s) Oral at bedtime    MEDICATIONS  (PRN):  acetaminophen     Tablet .. 650 milliGRAM(s) Oral every 6 hours PRN Mild Pain (1 - 3)  ketorolac 10 milliGRAM(s) Oral every 6 hours PRN Moderate Pain (4 - 6)  morphine  - Injectable 2 milliGRAM(s) IV Push every 6 hours PRN Severe Pain (7 - 10)      Consultant(s) Notes Reviewed:  [X] YES  [ ] NO  Care Discussed with Other Providers [X] YES  [ ] NO  Imaging Personally Reviewed:  [X] YES  [ ] NO      LABS:                        9.8    8.13  )-----------( 426      ( 16 May 2023 07:15 )             31.7     05-16    140  |  102  |  12  ----------------------------<  94  3.6   |  23  |  0.56    Ca    9.1      16 May 2023 07:15  Phos  2.7     05-16  Mg     1.50     05-16    TPro  6.9  /  Alb  3.2<L>  /  TBili  0.6  /  DBili  x   /  AST  15  /  ALT  31  /  AlkPhos  341<H>  05-16    PT/INR - ( 15 May 2023 10:55 )   PT: 16.8 sec;   INR: 1.44 ratio         PTT - ( 15 May 2023 10:55 )  PTT:38.9 sec

## 2023-05-16 NOTE — PROGRESS NOTE ADULT - ATTENDING COMMENTS
72 year old in midst of outpatient oncological workup (found to have lytic lesions concerning for malignancy, MM ruled out, concern for bone metastases with unknown primary), admitted with worsening LBP, found to have focal large nearly occlusive thrombus in the distal descending aorta.    Pain: started on PRN pain regimen, did not like opioids, will try toradol.      Thrombus:  Started lovenox (CT head negative), Vascular Cardiology input appreciated.  Will transition to NOAC for DC    Metastatic Disease with unknown Primary: O/p follow-up for further workup/treatment (discussed with Dr. Flores, will arrange for follow-up)    Anemia: s/p PRBCs as outpatient. Monitor

## 2023-05-16 NOTE — PROGRESS NOTE ADULT - PROBLEM SELECTOR PLAN 2
Pt endorses taking Tylenol & Advil alternating q3-4 hours at home to try and control her pain. Will start pain regimen & assess need  - PO Tylenol for mild pain  - PO oxycodone 5 mg q6h prn for moderate pain - needed twice overnight  - IV morphine 2 mg q6h prn for severe pain Pt endorses taking Tylenol & Advil alternating q3-4 hours at home to try and control her pain. Will start pain regimen & assess need  - PO Tylenol for mild pain  - will change PO oxy to PO toradol prn for moderate pain, as pt did not tolerate oxy well  - IV morphine 2 mg q6h prn for severe pain

## 2023-05-16 NOTE — PROGRESS NOTE ADULT - PROBLEM SELECTOR PLAN 8
DVT ppx: Lovenox  Diet: Regular  Dispo: Home DVT ppx: Lovenox (on hold for now but will resume if CTH negative)  Diet: Regular  Dispo: Home

## 2023-05-16 NOTE — PROGRESS NOTE ADULT - ATTENDING COMMENTS
Pt seen examined and d/w fellow. Son and niece at bedside. Reports pain is better controlled on current regimen. PE Lungs clear Heart RRR S1S2  Abd NABS soft / [place] A/P Have revd with th ept / family that she has CUP; further efforts with IHC staining has returned negative / non-contributory. Hence she remains with a poorly diff carcinoma of unknown primary. Have revd this is incurable, has a generally very poor prognosis and needs to be treated with a "generic" chemotherapy regimen that may cover several potential organs sites of origin; the RR tends to be low and tend to be brief in most pts. Will need mediport inserted and prep for starting such a regimen asap. Would also send assay for circulating tumor DNS sample to assess for any potential molecularly targetable marker with available therapies or to potentially make eligible for a clinical trial. Pt is agreeabl.e Have answered her questions. Upon d/c will f/u at the [facility] - will coordinate

## 2023-05-17 ENCOUNTER — APPOINTMENT (OUTPATIENT)
Dept: HEMATOLOGY ONCOLOGY | Facility: CLINIC | Age: 73
End: 2023-05-17

## 2023-05-17 DIAGNOSIS — R03.0 ELEVATED BLOOD-PRESSURE READING, WITHOUT DIAGNOSIS OF HYPERTENSION: ICD-10-CM

## 2023-05-17 LAB
ALBUMIN SERPL ELPH-MCNC: 3.2 G/DL — LOW (ref 3.3–5)
ALP SERPL-CCNC: 321 U/L — HIGH (ref 40–120)
ALT FLD-CCNC: 25 U/L — SIGNIFICANT CHANGE UP (ref 4–33)
ANION GAP SERPL CALC-SCNC: 14 MMOL/L — SIGNIFICANT CHANGE UP (ref 7–14)
AST SERPL-CCNC: 14 U/L — SIGNIFICANT CHANGE UP (ref 4–32)
B PERT DNA SPEC QL NAA+PROBE: SIGNIFICANT CHANGE UP
B PERT+PARAPERT DNA PNL SPEC NAA+PROBE: SIGNIFICANT CHANGE UP
BASE EXCESS BLDV CALC-SCNC: 2.4 MMOL/L — SIGNIFICANT CHANGE UP (ref -2–3)
BILIRUB SERPL-MCNC: 0.6 MG/DL — SIGNIFICANT CHANGE UP (ref 0.2–1.2)
BORDETELLA PARAPERTUSSIS (RAPRVP): SIGNIFICANT CHANGE UP
BUN SERPL-MCNC: 11 MG/DL — SIGNIFICANT CHANGE UP (ref 7–23)
C PNEUM DNA SPEC QL NAA+PROBE: SIGNIFICANT CHANGE UP
CA-I SERPL-SCNC: 1.2 MMOL/L — SIGNIFICANT CHANGE UP (ref 1.15–1.33)
CALCIUM SERPL-MCNC: 9.3 MG/DL — SIGNIFICANT CHANGE UP (ref 8.4–10.5)
CHLORIDE BLDV-SCNC: 102 MMOL/L — SIGNIFICANT CHANGE UP (ref 96–108)
CHLORIDE SERPL-SCNC: 102 MMOL/L — SIGNIFICANT CHANGE UP (ref 98–107)
CO2 BLDV-SCNC: 27.4 MMOL/L — HIGH (ref 22–26)
CO2 SERPL-SCNC: 24 MMOL/L — SIGNIFICANT CHANGE UP (ref 22–31)
CREAT SERPL-MCNC: 0.49 MG/DL — LOW (ref 0.5–1.3)
EGFR: 100 ML/MIN/1.73M2 — SIGNIFICANT CHANGE UP
FLUAV SUBTYP SPEC NAA+PROBE: SIGNIFICANT CHANGE UP
FLUBV RNA SPEC QL NAA+PROBE: SIGNIFICANT CHANGE UP
GAS PNL BLDV: 135 MMOL/L — LOW (ref 136–145)
GAS PNL BLDV: SIGNIFICANT CHANGE UP
GLUCOSE BLDV-MCNC: 115 MG/DL — HIGH (ref 70–99)
GLUCOSE SERPL-MCNC: 115 MG/DL — HIGH (ref 70–99)
HADV DNA SPEC QL NAA+PROBE: SIGNIFICANT CHANGE UP
HCO3 BLDV-SCNC: 26 MMOL/L — SIGNIFICANT CHANGE UP (ref 22–29)
HCOV 229E RNA SPEC QL NAA+PROBE: SIGNIFICANT CHANGE UP
HCOV HKU1 RNA SPEC QL NAA+PROBE: SIGNIFICANT CHANGE UP
HCOV NL63 RNA SPEC QL NAA+PROBE: SIGNIFICANT CHANGE UP
HCOV OC43 RNA SPEC QL NAA+PROBE: SIGNIFICANT CHANGE UP
HCT VFR BLD CALC: 31.3 % — LOW (ref 34.5–45)
HCT VFR BLDA CALC: 27 % — LOW (ref 34.5–46.5)
HGB BLD CALC-MCNC: 9 G/DL — LOW (ref 11.7–16.1)
HGB BLD-MCNC: 9.4 G/DL — LOW (ref 11.5–15.5)
HMPV RNA SPEC QL NAA+PROBE: SIGNIFICANT CHANGE UP
HPIV1 RNA SPEC QL NAA+PROBE: SIGNIFICANT CHANGE UP
HPIV2 RNA SPEC QL NAA+PROBE: SIGNIFICANT CHANGE UP
HPIV3 RNA SPEC QL NAA+PROBE: SIGNIFICANT CHANGE UP
HPIV4 RNA SPEC QL NAA+PROBE: SIGNIFICANT CHANGE UP
LACTATE BLDV-MCNC: 0.9 MMOL/L — SIGNIFICANT CHANGE UP (ref 0.5–2)
M PNEUMO DNA SPEC QL NAA+PROBE: SIGNIFICANT CHANGE UP
MAGNESIUM SERPL-MCNC: 1.8 MG/DL — SIGNIFICANT CHANGE UP (ref 1.6–2.6)
MCHC RBC-ENTMCNC: 26.3 PG — LOW (ref 27–34)
MCHC RBC-ENTMCNC: 30 GM/DL — LOW (ref 32–36)
MCV RBC AUTO: 87.7 FL — SIGNIFICANT CHANGE UP (ref 80–100)
NRBC # BLD: 0 /100 WBCS — SIGNIFICANT CHANGE UP (ref 0–0)
NRBC # FLD: 0 K/UL — SIGNIFICANT CHANGE UP (ref 0–0)
PCO2 BLDV: 37 MMHG — LOW (ref 39–52)
PH BLDV: 7.46 — HIGH (ref 7.32–7.43)
PHOSPHATE SERPL-MCNC: 2.8 MG/DL — SIGNIFICANT CHANGE UP (ref 2.5–4.5)
PLATELET # BLD AUTO: 361 K/UL — SIGNIFICANT CHANGE UP (ref 150–400)
PO2 BLDV: 75 MMHG — HIGH (ref 25–45)
POTASSIUM BLDV-SCNC: 3.5 MMOL/L — SIGNIFICANT CHANGE UP (ref 3.5–5.1)
POTASSIUM SERPL-MCNC: 3.7 MMOL/L — SIGNIFICANT CHANGE UP (ref 3.5–5.3)
POTASSIUM SERPL-SCNC: 3.7 MMOL/L — SIGNIFICANT CHANGE UP (ref 3.5–5.3)
PROCALCITONIN SERPL-MCNC: 0.14 NG/ML — HIGH (ref 0.02–0.1)
PROT SERPL-MCNC: 6.7 G/DL — SIGNIFICANT CHANGE UP (ref 6–8.3)
RAPID RVP RESULT: SIGNIFICANT CHANGE UP
RBC # BLD: 3.57 M/UL — LOW (ref 3.8–5.2)
RBC # FLD: 17.3 % — HIGH (ref 10.3–14.5)
RSV RNA SPEC QL NAA+PROBE: SIGNIFICANT CHANGE UP
RV+EV RNA SPEC QL NAA+PROBE: SIGNIFICANT CHANGE UP
SAO2 % BLDV: 96.3 % — HIGH (ref 67–88)
SARS-COV-2 RNA SPEC QL NAA+PROBE: SIGNIFICANT CHANGE UP
SODIUM SERPL-SCNC: 140 MMOL/L — SIGNIFICANT CHANGE UP (ref 135–145)
WBC # BLD: 9.08 K/UL — SIGNIFICANT CHANGE UP (ref 3.8–10.5)
WBC # FLD AUTO: 9.08 K/UL — SIGNIFICANT CHANGE UP (ref 3.8–10.5)

## 2023-05-17 PROCEDURE — 71045 X-RAY EXAM CHEST 1 VIEW: CPT | Mod: 26

## 2023-05-17 PROCEDURE — 99233 SBSQ HOSP IP/OBS HIGH 50: CPT | Mod: GC

## 2023-05-17 RX ORDER — AZITHROMYCIN 500 MG/1
500 TABLET, FILM COATED ORAL EVERY 24 HOURS
Refills: 0 | Status: DISCONTINUED | OUTPATIENT
Start: 2023-05-18 | End: 2023-05-18

## 2023-05-17 RX ORDER — ENOXAPARIN SODIUM 100 MG/ML
40 INJECTION SUBCUTANEOUS EVERY 12 HOURS
Refills: 0 | Status: DISCONTINUED | OUTPATIENT
Start: 2023-05-17 | End: 2023-05-17

## 2023-05-17 RX ORDER — AZITHROMYCIN 500 MG/1
TABLET, FILM COATED ORAL
Refills: 0 | Status: DISCONTINUED | OUTPATIENT
Start: 2023-05-17 | End: 2023-05-18

## 2023-05-17 RX ORDER — CEFTRIAXONE 500 MG/1
INJECTION, POWDER, FOR SOLUTION INTRAMUSCULAR; INTRAVENOUS
Refills: 0 | Status: COMPLETED | OUTPATIENT
Start: 2023-05-17 | End: 2023-05-23

## 2023-05-17 RX ORDER — CEFTRIAXONE 500 MG/1
1000 INJECTION, POWDER, FOR SOLUTION INTRAMUSCULAR; INTRAVENOUS EVERY 24 HOURS
Refills: 0 | Status: COMPLETED | OUTPATIENT
Start: 2023-05-18 | End: 2023-05-22

## 2023-05-17 RX ORDER — CEFTRIAXONE 500 MG/1
1000 INJECTION, POWDER, FOR SOLUTION INTRAMUSCULAR; INTRAVENOUS ONCE
Refills: 0 | Status: COMPLETED | OUTPATIENT
Start: 2023-05-17 | End: 2023-05-17

## 2023-05-17 RX ORDER — RIVAROXABAN 15 MG-20MG
15 KIT ORAL
Refills: 0 | Status: DISCONTINUED | OUTPATIENT
Start: 2023-05-17 | End: 2023-05-23

## 2023-05-17 RX ORDER — ACETAMINOPHEN 500 MG
650 TABLET ORAL EVERY 6 HOURS
Refills: 0 | Status: DISCONTINUED | OUTPATIENT
Start: 2023-05-17 | End: 2023-05-23

## 2023-05-17 RX ORDER — AZITHROMYCIN 500 MG/1
500 TABLET, FILM COATED ORAL ONCE
Refills: 0 | Status: COMPLETED | OUTPATIENT
Start: 2023-05-17 | End: 2023-05-17

## 2023-05-17 RX ADMIN — CEFTRIAXONE 1000 MILLIGRAM(S): 500 INJECTION, POWDER, FOR SOLUTION INTRAMUSCULAR; INTRAVENOUS at 15:55

## 2023-05-17 RX ADMIN — Medication 75 MICROGRAM(S): at 05:46

## 2023-05-17 RX ADMIN — RIVAROXABAN 15 MILLIGRAM(S): KIT at 18:36

## 2023-05-17 RX ADMIN — Medication 3 MILLIGRAM(S): at 21:37

## 2023-05-17 RX ADMIN — PANTOPRAZOLE SODIUM 40 MILLIGRAM(S): 20 TABLET, DELAYED RELEASE ORAL at 05:47

## 2023-05-17 RX ADMIN — Medication 100 MILLIGRAM(S): at 12:39

## 2023-05-17 RX ADMIN — Medication 1000 UNIT(S): at 11:36

## 2023-05-17 RX ADMIN — Medication 100 MILLIGRAM(S): at 21:38

## 2023-05-17 RX ADMIN — SENNA PLUS 1 TABLET(S): 8.6 TABLET ORAL at 21:37

## 2023-05-17 RX ADMIN — POLYETHYLENE GLYCOL 3350 17 GRAM(S): 17 POWDER, FOR SOLUTION ORAL at 21:41

## 2023-05-17 RX ADMIN — Medication 650 MILLIGRAM(S): at 14:29

## 2023-05-17 RX ADMIN — AZITHROMYCIN 500 MILLIGRAM(S): 500 TABLET, FILM COATED ORAL at 16:50

## 2023-05-17 RX ADMIN — Medication 650 MILLIGRAM(S): at 16:15

## 2023-05-17 NOTE — PROGRESS NOTE ADULT - SUBJECTIVE AND OBJECTIVE BOX
DATE OF SERVICE: 05-17-23 @ 07:54    Patient is a 72y old  Female who presents with a chief complaint of Malignancy workup (16 May 2023 21:03)      SUBJECTIVE / OVERNIGHT EVENTS:  No acute events overnight  Noted T 100.4 overnight. BP stably elevated 168-170 systolic. SpO2 92-95% on 3L.   ***    MEDICATIONS  (STANDING):  cholecalciferol 1000 Unit(s) Oral daily  levothyroxine 75 MICROGram(s) Oral daily  melatonin 3 milliGRAM(s) Oral at bedtime  pantoprazole    Tablet 40 milliGRAM(s) Oral before breakfast  polyethylene glycol 3350 17 Gram(s) Oral at bedtime  senna 1 Tablet(s) Oral at bedtime    MEDICATIONS  (PRN):  acetaminophen     Tablet .. 650 milliGRAM(s) Oral every 6 hours PRN Mild Pain (1 - 3)  ketorolac 10 milliGRAM(s) Oral every 6 hours PRN Moderate Pain (4 - 6)  morphine  - Injectable 2 milliGRAM(s) IV Push every 6 hours PRN Severe Pain (7 - 10)      Vital Signs Last 24 Hrs  T(C): 37.2 (17 May 2023 06:00), Max: 38 (16 May 2023 21:52)  T(F): 98.9 (17 May 2023 06:00), Max: 100.4 (16 May 2023 21:52)  HR: 61 (17 May 2023 06:00) (61 - 71)  BP: 170/72 (17 May 2023 06:00) (168/90 - 170/72)  BP(mean): --  RR: 18 (17 May 2023 06:00) (16 - 18)  SpO2: 95% (17 May 2023 06:00) (92% - 96%)    Parameters below as of 17 May 2023 06:00  Patient On (Oxygen Delivery Method): nasal cannula  O2 Flow (L/min): 3      PHYSICAL EXAM:  GENERAL: No apparent distress  HEAD:  Atraumatic, normocephalic  EYES: EOMI, PERRLA, conjunctiva and sclera clear  NECK: Supple, no JVD  CHEST/LUNG: Clear to auscultation bilaterally; non-labored respirations on 2L NC  HEART: Regular rate and rhythm; No murmurs, rubs, or gallops  ABDOMEN: Soft, nontender, nondistended; Bowel sounds present  EXTREMITIES:  2+ peripheral pulses; No clubbing, cyanosis, or edema  PSYCH: AAOx3  NEUROLOGY: No focal deficits  SKIN: No rashes or lesions      LABS:                        9.4    9.08  )-----------( 361      ( 17 May 2023 06:02 )             31.3     05-17    140  |  102  |  11  ----------------------------<  115<H>  3.7   |  24  |  0.49<L>    Ca    9.3      17 May 2023 06:02  Phos  2.8     05-17  Mg     1.80     05-17    TPro  6.7  /  Alb  3.2<L>  /  TBili  0.6  /  DBili  x   /  AST  14  /  ALT  25  /  AlkPhos  321<H>  05-17      RADIOLOGY & ADDITIONAL TESTS:  Reviewed     DATE OF SERVICE: 05-17-23 @ 07:54    Patient is a 72y old  Female who presents with a chief complaint of Malignancy workup (16 May 2023 21:03)      SUBJECTIVE / OVERNIGHT EVENTS:  No acute events overnight  Noted T 100.4 overnight. BP stably elevated 168-170 systolic. SpO2 92-95% on 3L.   Reports intermittent non-productive cough. Denies headache, chest pain, SOB, abd pain, diarrhea. Pt had a BM yesterday. Tolerating PO. Back pain reasonably well-controlled.    MEDICATIONS  (STANDING):  cholecalciferol 1000 Unit(s) Oral daily  levothyroxine 75 MICROGram(s) Oral daily  melatonin 3 milliGRAM(s) Oral at bedtime  pantoprazole    Tablet 40 milliGRAM(s) Oral before breakfast  polyethylene glycol 3350 17 Gram(s) Oral at bedtime  senna 1 Tablet(s) Oral at bedtime    MEDICATIONS  (PRN):  acetaminophen     Tablet .. 650 milliGRAM(s) Oral every 6 hours PRN Mild Pain (1 - 3)  ketorolac 10 milliGRAM(s) Oral every 6 hours PRN Moderate Pain (4 - 6)  morphine  - Injectable 2 milliGRAM(s) IV Push every 6 hours PRN Severe Pain (7 - 10)      Vital Signs Last 24 Hrs  T(C): 37.2 (17 May 2023 06:00), Max: 38 (16 May 2023 21:52)  T(F): 98.9 (17 May 2023 06:00), Max: 100.4 (16 May 2023 21:52)  HR: 61 (17 May 2023 06:00) (61 - 71)  BP: 170/72 (17 May 2023 06:00) (168/90 - 170/72)  BP(mean): --  RR: 18 (17 May 2023 06:00) (16 - 18)  SpO2: 95% (17 May 2023 06:00) (92% - 96%)    Parameters below as of 17 May 2023 06:00  Patient On (Oxygen Delivery Method): nasal cannula  O2 Flow (L/min): 3      PHYSICAL EXAM:  GENERAL: No apparent distress  HEAD:  Atraumatic, normocephalic  EYES: EOMI, PERRLA, conjunctiva and sclera clear  NECK: Supple, no JVD  CHEST/LUNG: Clear to auscultation bilaterally; non-labored respirations on 2L NC  HEART: Regular rate and rhythm; No murmurs, rubs, or gallops  ABDOMEN: Soft, nontender, nondistended; Bowel sounds present  EXTREMITIES:  2+ peripheral pulses; No clubbing, cyanosis, or edema  PSYCH: AAOx3  NEUROLOGY: No focal deficits  SKIN: No rashes or lesions      LABS:                        9.4    9.08  )-----------( 361      ( 17 May 2023 06:02 )             31.3     05-17    140  |  102  |  11  ----------------------------<  115<H>  3.7   |  24  |  0.49<L>    Ca    9.3      17 May 2023 06:02  Phos  2.8     05-17  Mg     1.80     05-17    TPro  6.7  /  Alb  3.2<L>  /  TBili  0.6  /  DBili  x   /  AST  14  /  ALT  25  /  AlkPhos  321<H>  05-17      RADIOLOGY & ADDITIONAL TESTS:  Reviewed     DATE OF SERVICE: 05-17-23 @ 07:54    Patient is a 72y old  Female who presents with a chief complaint of Malignancy workup (16 May 2023 21:03)      SUBJECTIVE / OVERNIGHT EVENTS:  No acute events overnight  Noted T 100.4 overnight. BP stably elevated 168-170 systolic. SpO2 92-95% on 3L.   Reports intermittent non-productive cough. Denies headache, chest pain, SOB, abd pain, diarrhea. Pt had a BM yesterday. Back pain reasonably well-controlled though pt reports still having difficulty sleeping due to pain.    MEDICATIONS  (STANDING):  cholecalciferol 1000 Unit(s) Oral daily  levothyroxine 75 MICROGram(s) Oral daily  melatonin 3 milliGRAM(s) Oral at bedtime  pantoprazole    Tablet 40 milliGRAM(s) Oral before breakfast  polyethylene glycol 3350 17 Gram(s) Oral at bedtime  senna 1 Tablet(s) Oral at bedtime    MEDICATIONS  (PRN):  acetaminophen     Tablet .. 650 milliGRAM(s) Oral every 6 hours PRN Mild Pain (1 - 3)  ketorolac 10 milliGRAM(s) Oral every 6 hours PRN Moderate Pain (4 - 6)  morphine  - Injectable 2 milliGRAM(s) IV Push every 6 hours PRN Severe Pain (7 - 10)      Vital Signs Last 24 Hrs  T(C): 37.2 (17 May 2023 06:00), Max: 38 (16 May 2023 21:52)  T(F): 98.9 (17 May 2023 06:00), Max: 100.4 (16 May 2023 21:52)  HR: 61 (17 May 2023 06:00) (61 - 71)  BP: 170/72 (17 May 2023 06:00) (168/90 - 170/72)  BP(mean): --  RR: 18 (17 May 2023 06:00) (16 - 18)  SpO2: 95% (17 May 2023 06:00) (92% - 96%)    Parameters below as of 17 May 2023 06:00  Patient On (Oxygen Delivery Method): nasal cannula  O2 Flow (L/min): 3      PHYSICAL EXAM:  GENERAL: No apparent distress  HEAD:  Atraumatic, normocephalic  EYES: EOMI, PERRLA, conjunctiva and sclera clear  NECK: Supple, no JVD  CHEST/LUNG: Clear to auscultation bilaterally; non-labored respirations on 3L NC  HEART: Regular rate and rhythm; No murmurs, rubs, or gallops  ABDOMEN: Soft, nontender, nondistended; Bowel sounds present  EXTREMITIES:  2+ peripheral pulses; No clubbing, cyanosis, or edema  PSYCH: AAOx3  NEUROLOGY: No focal deficits  SKIN: No rashes or lesions      LABS:                        9.4    9.08  )-----------( 361      ( 17 May 2023 06:02 )             31.3     05-17    140  |  102  |  11  ----------------------------<  115<H>  3.7   |  24  |  0.49<L>    Ca    9.3      17 May 2023 06:02  Phos  2.8     05-17  Mg     1.80     05-17    TPro  6.7  /  Alb  3.2<L>  /  TBili  0.6  /  DBili  x   /  AST  14  /  ALT  25  /  AlkPhos  321<H>  05-17      RADIOLOGY & ADDITIONAL TESTS:  Reviewed

## 2023-05-17 NOTE — PROGRESS NOTE ADULT - ATTENDING COMMENTS
72 year old in midst of outpatient oncological workup (found to have lytic lesions concerning for malignancy, MM ruled out, concern for bone metastases with unknown primary), admitted with worsening LBP, found to have focal large nearly occlusive thrombus in the distal descending aorta.    Pain: started on PRN pain regimen, did not like opioids, pain controlled with tylenol/NSAIDs     Thrombus:  Started lovenox (CT head negative), Vascular Cardiology input appreciated.  Will transition to NOAC for DC    Metastatic Disease with unknown Primary: O/p follow-up for further workup/treatment (discussed with Dr. Flores, will arrange for follow-up)    Anemia: s/p PRBCs as outpatient. Monitor    Cough/Hypoxia:  Pt with hypoxia, resolved. low grade fever 100.4, CT chest non-diagnostic, RVP negative (COVID exposure).    DC planning.

## 2023-05-17 NOTE — PROGRESS NOTE ADULT - PROBLEM SELECTOR PLAN 6
- c/w synthroid Noted systolic blood pressures persistently 160-170 during admission. No prior hx HTN, not on antihypertensives  - may be in setting of pain, acute stress  - asymptomatic  - monitor off anti-HTN meds for now  - PCP f/u

## 2023-05-17 NOTE — PROGRESS NOTE ADULT - PROBLEM SELECTOR PLAN 4
Pt endorses taking Tylenol & Advil alternating q3-4 hours at home to try and control her pain. Will start pain regimen & assess need  - PO Tylenol for mild pain  - PO Toradol for moderate pain  - IV morphine 2 mg q6h prn for severe pain

## 2023-05-17 NOTE — PROGRESS NOTE ADULT - PROBLEM SELECTOR PLAN 1
Noted desat to 80% on RA on 5/15, placed on 6L NC with improvement. Since weaned to 2L sating 97-99%  - possibly in setting of pain vs atelectasis (noted bibasilar atelectasis on CT)  - 92-95% overnight on 3L  - incentive spirometry  - pain control  - wean O2 as tolerated Noted desat to 80% on RA on 5/15, placed on 6L NC with improvement. Since weaned to 2L sating 97-99%  - possibly in setting of pain vs atelectasis (noted bibasilar atelectasis on CT)  - 92-95% overnight on 3L  - send RVP  - incentive spirometry  - pain control  - wean O2 as tolerated Noted desat to 80% on RA on 5/15, placed on 6L NC with improvement. Since weaned to 3L. Also reporting worsened cough (pt sometimes has cough in setting of reflux at home)  - possibly in setting of pain vs atelectasis (noted bibasilar atelectasis on CT)  - 92-95% overnight on 3L  - send RVP  - incentive spirometry  - pain control  - wean O2 as tolerated

## 2023-05-17 NOTE — CONSULT NOTE ADULT - SUBJECTIVE AND OBJECTIVE BOX
HEMATOLOGY ONCOLOGY CONSULT     Patient is a 72y old  Female who presents with a chief complaint of Malignancy workup (15 May 2023 14:45)      HPI:       ROS negative except as indicated in the HPI.    PAST MEDICAL & SURGICAL HISTORY:  Hypothyroidism      Vitiligo      Psoriasis      Acid reflux          SOCIAL HISTORY:    FAMILY HISTORY:  No pertinent family history in first degree relatives        MEDICATIONS  (STANDING):  cholecalciferol 1000 Unit(s) Oral daily  levothyroxine 75 MICROGram(s) Oral daily  pantoprazole    Tablet 40 milliGRAM(s) Oral before breakfast    MEDICATIONS  (PRN):      Allergies    No Known Allergies    Intolerances        Vital Signs Last 24 Hrs  T(C): 36.3 (15 May 2023 11:19), Max: 36.4 (15 May 2023 09:34)  T(F): 97.4 (15 May 2023 11:19), Max: 97.5 (15 May 2023 09:34)  HR: 63 (15 May 2023 11:19) (60 - 63)  BP: 145/70 (15 May 2023 11:19) (145/70 - 161/67)  BP(mean): --  RR: 16 (15 May 2023 11:19) (16 - 16)  SpO2: 100% (15 May 2023 11:19) (99% - 100%)    Parameters below as of 15 May 2023 11:19  Patient On (Oxygen Delivery Method): room air        PHYSICAL EXAM  General: adult in NAD  HEENT: clear oropharynx, anicteric sclera, pink conjunctiva  Neck: supple  CV: normal S1/S2 with no murmur rubs or gallops  Lungs: positive air movement b/l ant lungs, clear to auscultation, no wheezes, no rales  Abdomen: soft non-tender non-distended, no hepatosplenomegaly  Ext: no clubbing cyanosis or edema  Skin: no rashes and no petechiae  Neuro: alert and oriented X 4, no focal deficits      LABS:                          10.3   8.58  )-----------( 412      ( 15 May 2023 10:55 )             33.7         Mean Cell Volume : 86.9 fL  Mean Cell Hemoglobin : 26.5 pg  Mean Cell Hemoglobin Concentration : 30.6 gm/dL  Auto Neutrophil # : 7.15 K/uL  Auto Lymphocyte # : 0.64 K/uL  Auto Monocyte # : 0.62 K/uL  Auto Eosinophil # : 0.08 K/uL  Auto Basophil # : 0.03 K/uL  Auto Neutrophil % : 83.4 %  Auto Lymphocyte % : 7.5 %  Auto Monocyte % : 7.2 %  Auto Eosinophil % : 0.9 %  Auto Basophil % : 0.3 %      05-15    142  |  104  |  16  ----------------------------<  118<H>  3.8   |  25  |  0.56    Ca    9.3      15 May 2023 10:55    TPro  7.1  /  Alb  3.4  /  TBili  0.5  /  DBili  x   /  AST  28  /  ALT  41<H>  /  AlkPhos  370<H>  05-15          PT/INR - ( 15 May 2023 10:55 )   PT: 16.8 sec;   INR: 1.44 ratio         PTT - ( 15 May 2023 10:55 )  PTT:38.9 sec                  
Interventional Radiology    HPI: 72y Female recently diagnosed with poorly differentiated carcinoma of unknown primary secondary to biopsy of a lytic bone lesion. IR consulted for mediport placement.    Allergies: No Known Allergies    Medications (Abx/Cardiac/Anticoagulation/Blood Products)    enoxaparin Injectable: 40 milliGRAM(s) SubCutaneous (05-16 @ 05:30)    Data:    T(C): 38.4  HR: 75  BP: 174/79  RR: 19  SpO2: 92%    -WBC 9.08 / HgB 9.4 / Hct 31.3 / Plt 361  -Na 140 / Cl 102 / BUN 11 / Glucose 115  -K 3.7 / CO2 24 / Cr 0.49  -ALT 25 / Alk Phos 321 / T.Bili 0.6  -INR 1.44 / PTT 38.9      Assessment/Plan: 71 yo F with poorly differentiated carcinoma of unknown etiology. IR consulted for mediport placement for chemotherapy.     -- case discussed with attending Dr. López   -- Due to high infection rates with inpatient placement, mediport placement recommended to be done as an outpatient. This is usually done around 1 week s/p discharge  -- please refer to the outpatient book office phone number bellow to schedule mediport placement after discharge.    --  Maria Dolores Butts, PGY-3  Diagnostic Radiology  Available on [employer] Teams    - Non-emergent consults: Place IR consult order in [place]  - Emergent issues (pager): [facility] [phone]; [facility] [phone]; [phone]  - Scheduling questions: [facility] [phone]; [facility] [phone]  - Clinic/outpatient booking: [facility] [phone]; [facility] [phone]
Cardiology/Vascular Medicine Inpatient Consultation Note    HISTORY OF PRESENT ILLNESS:    Ms. Kiesha Rojas is a 72 year old woman with hypothyroidism, vitiligo, psoriasis, and GERD.    She started to note low back & Right-sided pain in January 2023.  PET-CT scan demonstrated FDG-avid vertebral lytic lesions.   Underwent bone biopsy of L2 lytic lesion which demonstrated poorly differentiated carcinoma.  She was instructed by her oncologist to visit our ED for expedited malignancy workup and for management of bone pain.     CT chest and abdomen/pelvis identified focal, nearly occlusive thrombus in distal thoracic descending aorta, with splenic infarcts, hepatomegaly with heterogeneous liver enhancement, and multiple osseous lytic lesions in the thoracic, lumbar spine, and pelvis.      At the time of my evaluation this morning, the patient appeared clinically and hemodynamically stable.  She was reporting having headaches without neurologic deficits  NAD, thin in appearance  Appeared euvolemic  No LE edema    Impression:  1. Cancer-associated arterial thrombosis (focal, nearly occlusive thrombus in distal thoracic descending aorta).  2. Anemia, likely from bone marrow suppression. Denies active bleeding.  3. Headaches    Recommendations:  1. If no active contraindications, would recommend initiating anticoagulation.  Enoxaparin at 1 mg/kg BID is the preferred agent at this time based on its shorter half-life.  2. Prior to initiating anticoagulation, would evaluate with head CT to rule out intracranial metastasis.  3. Defer to CT surgery for evaluation of possible invasive intervention of arterial thrombosis.  In this patient with advanced co-morbid conditions include metastatic cancer, anticipate they will recommend conservative medical management as risks of surgery will outweigh benefits.  4. Ongoing malignancy workup with known metastatic involvement of bone (unclear origin at this time).      Allergies  No Known Allergies    MEDICATIONS:  enoxaparin Injectable 40 milliGRAM(s) SubCutaneous every 12 hours  acetaminophen     Tablet .. 650 milliGRAM(s) Oral every 6 hours PRN  ketorolac 10 milliGRAM(s) Oral every 6 hours PRN  morphine  - Injectable 2 milliGRAM(s) IV Push every 6 hours PRN  pantoprazole    Tablet 40 milliGRAM(s) Oral before breakfast  polyethylene glycol 3350 17 Gram(s) Oral at bedtime  senna 1 Tablet(s) Oral at bedtime  levothyroxine 75 MICROGram(s) Oral daily  cholecalciferol 1000 Unit(s) Oral daily    PAST MEDICAL & SURGICAL HISTORY:  Hypothyroidism  Vitiligo  Psoriasis  Acid reflux    FAMILY HISTORY:  No pertinent family history in first degree relatives    SOCIAL HISTORY:    As above, as per chart notes    REVIEW OF SYSTEMS:  As above, as per chart notes    PHYSICAL EXAM:  T(C): 37.2 (05-16-23 @ 13:45), Max: 37.2 (05-16-23 @ 05:29)  HR: 63 (05-16-23 @ 13:45) (62 - 65)  BP: 170/65 (05-16-23 @ 13:45) (160/69 - 170/65)  RR: 16 (05-16-23 @ 13:45) (16 - 17)  SpO2: 96% (05-16-23 @ 13:45) (96% - 99%)    Appearance: NAD, laying flat in bed, thin in apperance  HEENT:   No apparent JVD  Cardiovascular: Normal S1 S2  Respiratory: Lungs clear to auscultation	  Psychiatry: Awake, alert  Neurologic: Non-focal  Extremities: No LE edema      LABS:	 	    CBC Full  -  ( 16 May 2023 07:15 )  WBC Count : 8.13 K/uL  Hemoglobin : 9.8 g/dL  Hematocrit : 31.7 %  Platelet Count - Automated : 426 K/uL  Mean Cell Volume : 86.1 fL  Mean Cell Hemoglobin : 26.6 pg  Mean Cell Hemoglobin Concentration : 30.9 gm/dL  Auto Neutrophil # : 6.83 K/uL  Auto Lymphocyte # : 0.61 K/uL  Auto Monocyte # : 0.58 K/uL  Auto Eosinophil # : 0.03 K/uL  Auto Basophil # : 0.02 K/uL  Auto Neutrophil % : 84.1 %  Auto Lymphocyte % : 7.5 %  Auto Monocyte % : 7.1 %  Auto Eosinophil % : 0.4 %  Auto Basophil % : 0.2 %    05-16    140  |  102  |  12  ----------------------------<  94  3.6   |  23  |  0.56  05-15    142  |  104  |  16  ----------------------------<  118<H>  3.8   |  25  |  0.56    Ca    9.1      16 May 2023 07:15  Ca    9.3      15 May 2023 10:55  Phos  2.7     05-16  Mg     1.50     05-16    TPro  6.9  /  Alb  3.2<L>  /  TBili  0.6  /  DBili  x   /  AST  15  /  ALT  31  /  AlkPhos  341<H>  05-16  TPro  7.1  /  Alb  3.4  /  TBili  0.5  /  DBili  x   /  AST  28  /  ALT  41<H>  /  AlkPhos  370<H>  05-15      < from: CT Abdomen and Pelvis w/ IV Cont (05.15.23 @ 11:43) >    ACC: [id number] EXAM:  CT CHEST IC   ORDERED BY: SIDNEY DUMONT     ACC: [id number] EXAM:  CT ABDOMEN AND PELVIS IC   ORDERED BY: SIDNEY DUMONT     PROCEDURE DATE:  05/15/2023          INTERPRETATION:  CLINICAL INFORMATION: Back pain, recent biopsy of   vertebral body lesion positive for malignancy, metastatic workup.    COMPARISON: Outside PET/CT 2/20/2023    CONTRAST/COMPLICATIONS:  IV Contrast: Omnipaque 350  90 cc administered   10 cc discarded  Oral Contrast: NONE  Complications: None reported at time of study completion    PROCEDURE:  CT of the Chest, Abdomen and Pelvis was performed.  Sagittal and coronal reformats were performed.    FINDINGS:  CHEST:  LUNGS AND LARGE AIRWAYS: Patent central airways. No pulmonary nodule.   Bibasilar subsegmental atelectasis.  PLEURA: No pleural effusion.  VESSELS: Focal irregular thrombus in the distal descending thoracic aorta   resulting in severe luminal narrowing spanning approximately 5.4 cm in   craniocaudal dimension.  HEART: Heart size is normal. No pericardial effusion.  MEDIASTINUM AND LESVIA: No lymphadenopathy.  CHEST WALL AND LOWER NECK: Within normal limits.    ABDOMEN AND PELVIS:  LIVER: Hepatomegaly. Heterogeneous enhancement.  BILE DUCTS: Normal caliber.  GALLBLADDER: Pericholecystic fluid.  SPLEEN: Few wedge-shaped hypodensities in the superior and inferior   poles, compatible with infarcts.  PANCREAS: Within normal limits.  ADRENALS: Within normal limits.  KIDNEYS/URETERS: Symmetric enhancement. No hydronephrosis.    BLADDER: Within normal limits.  REPRODUCTIVE ORGANS: Fibroid uterus.    BOWEL: No bowel obstruction. Appendix is not visualized. Colonic   diverticulosis without acute diverticulitis.  PERITONEUM: Small volume perihepatic and pelvic ascites.  VESSELS: Atherosclerotic calcification. Nonopacification of the hepatic   veins.  RETROPERITONEUM/LYMPH NODES: No lymphadenopathy.  ABDOMINAL WALL: Within normal limits.  BONES: Multiple lytic lesions in the spine involving the T9, T10, T12,   and L2 vertebral bodies,and T11 right L2 pedicle. Additional smaller   lytic lesions in the right sacrum and left iliac wing.    IMPRESSION:  Focal large nearly occlusive thrombus in the distal descending aorta.    Splenic infarcts.    Hepatomegaly and nonspecific perihepatic and pericholecystic fluid.   Heterogeneous enhancement of the liver and nonopacification of the   hepatic veins, which may be related to early contrast timing. Consider   duplex ultrasound of the liver to evaluate to exclude hepatic vein   thrombus.    Multiple osseous lytic lesions in the spine and pelvis, concerning for   metastases.    Dr. MELISSA Yao discussed the findings with Dr. Angel MD at 5/15/2023   12:35 PM, with verbal confirmation.      --- End of Report ---          DELMAR YAO MD; Resident Radiologist  This document has been electronically signed.  MARY TAVERAS MD; Attending Radiologist  This document has been electronically signed. May 15 2023  1:17PM    < end of copied text >  < from: CT Head No Cont (05.16.23 @ 14:52) >    ACC: [id number] EXAM:  CT BRAIN   ORDERED BY: JOSE CARLOS DANIELS     PROCEDURE DATE:  05/16/2023          INTERPRETATION:  Noncontrast CT of the brain.    CLINICAL INDICATION:  Headache after starting anticoagulation, new   metastatic malignancy of unknown origin    TECHNIQUE : Axial CT scanning of the brain was obtained from the skull   base to the vertex without the administration of intravenous contrast.    COMPARISON: None available    FINDINGS:  No acute hemorrhage, hydrocephalus, midline shift or   extra-axial collections are identified. Age-appropriate involutional and   mild microvascular ischemic changes are present.    No destructive lesions of the skull are noted.    The orbits are not remarkable in appearance.    The visualized paranasal sinuses and tympanomastoid cavities are free of   acute disease.    IMPRESSION:    No acute hemorrhage, mass effect or calvarial lesion.    --- End of Report ---            MARILUZ CHAN MD; Attending Radiologist  This document has been electronicallysigned. May 16 2023  3:03PM    < end of copied text >

## 2023-05-17 NOTE — PROGRESS NOTE ADULT - PROBLEM SELECTOR PLAN 3
Noted on CT CAP on admission to have focal, large, nearly occlusive thrombus in distal thoracic aorta. Obtained cardiac surgery eval in ED as they manage thoracic aorta thrombotic dz (not vascular surgery)  - no acute intervention recommended by cardiac surgery, thrombus is chronic appearing  - no contraindication to AC per heme  - CTH negative for bleed (noted new HA after starting Lovenox, which resolved)  - started Lovenox (therapeutic dosing)       - holding Lovenox prior to possible port placement Noted on CT CAP on admission to have focal, large, nearly occlusive thrombus in distal thoracic aorta. Obtained cardiac surgery eval in ED as they manage thoracic aorta thrombotic dz (not vascular surgery)  - no acute intervention recommended by cardiac surgery, thrombus is chronic appearing  - no contraindication to AC per heme  - CTH negative for bleed (noted new HA after starting Lovenox, which resolved)  - started Lovenox (therapeutic dosing)       - holding Lovenox prior to possible port placement  - will discuss with onc re transition to DOAC on discharge

## 2023-05-17 NOTE — PROGRESS NOTE ADULT - PROBLEM SELECTOR PLAN 2
Undergoing active malignancy workup, initial bone biopsy report from 5/5 notable for poorly differentiated carcinoma. Currently pending additional immunostaining per path. Labs notable for elevated ALP likely in setting of lytic lesions  - known metastatic dz with bone mets  - per onc, final path indicates carcinoma of unknown primary origin with poorly differentiated histology  - IR consulted for urgent port placement  - onc recs appreciated

## 2023-05-18 PROBLEM — L40.9 PSORIASIS, UNSPECIFIED: Chronic | Status: ACTIVE | Noted: 2023-05-15

## 2023-05-18 PROBLEM — K21.9 GASTRO-ESOPHAGEAL REFLUX DISEASE WITHOUT ESOPHAGITIS: Chronic | Status: ACTIVE | Noted: 2023-05-15

## 2023-05-18 PROBLEM — E03.9 HYPOTHYROIDISM, UNSPECIFIED: Chronic | Status: ACTIVE | Noted: 2023-05-15

## 2023-05-18 PROBLEM — L80 VITILIGO: Chronic | Status: ACTIVE | Noted: 2023-05-15

## 2023-05-18 LAB
ALBUMIN SERPL ELPH-MCNC: 3.1 G/DL — LOW (ref 3.3–5)
ALP SERPL-CCNC: 336 U/L — HIGH (ref 40–120)
ALT FLD-CCNC: 27 U/L — SIGNIFICANT CHANGE UP (ref 4–33)
ANION GAP SERPL CALC-SCNC: 12 MMOL/L — SIGNIFICANT CHANGE UP (ref 7–14)
AST SERPL-CCNC: 19 U/L — SIGNIFICANT CHANGE UP (ref 4–32)
B PERT DNA SPEC QL NAA+PROBE: SIGNIFICANT CHANGE UP
B PERT+PARAPERT DNA PNL SPEC NAA+PROBE: SIGNIFICANT CHANGE UP
BASOPHILS # BLD AUTO: 0.04 K/UL — SIGNIFICANT CHANGE UP (ref 0–0.2)
BASOPHILS NFR BLD AUTO: 0.4 % — SIGNIFICANT CHANGE UP (ref 0–2)
BILIRUB SERPL-MCNC: 0.8 MG/DL — SIGNIFICANT CHANGE UP (ref 0.2–1.2)
BORDETELLA PARAPERTUSSIS (RAPRVP): SIGNIFICANT CHANGE UP
BUN SERPL-MCNC: 8 MG/DL — SIGNIFICANT CHANGE UP (ref 7–23)
C PNEUM DNA SPEC QL NAA+PROBE: SIGNIFICANT CHANGE UP
CALCIUM SERPL-MCNC: 9.4 MG/DL — SIGNIFICANT CHANGE UP (ref 8.4–10.5)
CHLORIDE SERPL-SCNC: 98 MMOL/L — SIGNIFICANT CHANGE UP (ref 98–107)
CO2 SERPL-SCNC: 25 MMOL/L — SIGNIFICANT CHANGE UP (ref 22–31)
CREAT SERPL-MCNC: 0.49 MG/DL — LOW (ref 0.5–1.3)
EGFR: 100 ML/MIN/1.73M2 — SIGNIFICANT CHANGE UP
EOSINOPHIL # BLD AUTO: 0.01 K/UL — SIGNIFICANT CHANGE UP (ref 0–0.5)
EOSINOPHIL NFR BLD AUTO: 0.1 % — SIGNIFICANT CHANGE UP (ref 0–6)
FLUAV SUBTYP SPEC NAA+PROBE: SIGNIFICANT CHANGE UP
FLUBV RNA SPEC QL NAA+PROBE: SIGNIFICANT CHANGE UP
GLUCOSE SERPL-MCNC: 117 MG/DL — HIGH (ref 70–99)
HADV DNA SPEC QL NAA+PROBE: SIGNIFICANT CHANGE UP
HCOV 229E RNA SPEC QL NAA+PROBE: SIGNIFICANT CHANGE UP
HCOV HKU1 RNA SPEC QL NAA+PROBE: SIGNIFICANT CHANGE UP
HCOV NL63 RNA SPEC QL NAA+PROBE: SIGNIFICANT CHANGE UP
HCOV OC43 RNA SPEC QL NAA+PROBE: SIGNIFICANT CHANGE UP
HCT VFR BLD CALC: 33 % — LOW (ref 34.5–45)
HGB BLD-MCNC: 10 G/DL — LOW (ref 11.5–15.5)
HMPV RNA SPEC QL NAA+PROBE: SIGNIFICANT CHANGE UP
HPIV1 RNA SPEC QL NAA+PROBE: SIGNIFICANT CHANGE UP
HPIV2 RNA SPEC QL NAA+PROBE: SIGNIFICANT CHANGE UP
HPIV3 RNA SPEC QL NAA+PROBE: SIGNIFICANT CHANGE UP
HPIV4 RNA SPEC QL NAA+PROBE: SIGNIFICANT CHANGE UP
IANC: 8.85 K/UL — HIGH (ref 1.8–7.4)
IMM GRANULOCYTES NFR BLD AUTO: 0.9 % — SIGNIFICANT CHANGE UP (ref 0–0.9)
LEGIONELLA AG UR QL: NEGATIVE — SIGNIFICANT CHANGE UP
LYMPHOCYTES # BLD AUTO: 0.35 K/UL — LOW (ref 1–3.3)
LYMPHOCYTES # BLD AUTO: 3.4 % — LOW (ref 13–44)
M PNEUMO DNA SPEC QL NAA+PROBE: SIGNIFICANT CHANGE UP
MAGNESIUM SERPL-MCNC: 1.9 MG/DL — SIGNIFICANT CHANGE UP (ref 1.6–2.6)
MCHC RBC-ENTMCNC: 26 PG — LOW (ref 27–34)
MCHC RBC-ENTMCNC: 30.3 GM/DL — LOW (ref 32–36)
MCV RBC AUTO: 85.9 FL — SIGNIFICANT CHANGE UP (ref 80–100)
MONOCYTES # BLD AUTO: 0.87 K/UL — SIGNIFICANT CHANGE UP (ref 0–0.9)
MONOCYTES NFR BLD AUTO: 8.5 % — SIGNIFICANT CHANGE UP (ref 2–14)
NEUTROPHILS # BLD AUTO: 8.85 K/UL — HIGH (ref 1.8–7.4)
NEUTROPHILS NFR BLD AUTO: 86.7 % — HIGH (ref 43–77)
NRBC # BLD: 0 /100 WBCS — SIGNIFICANT CHANGE UP (ref 0–0)
NRBC # FLD: 0 K/UL — SIGNIFICANT CHANGE UP (ref 0–0)
PHOSPHATE SERPL-MCNC: 2.5 MG/DL — SIGNIFICANT CHANGE UP (ref 2.5–4.5)
PLATELET # BLD AUTO: 359 K/UL — SIGNIFICANT CHANGE UP (ref 150–400)
POTASSIUM SERPL-MCNC: 4.3 MMOL/L — SIGNIFICANT CHANGE UP (ref 3.5–5.3)
POTASSIUM SERPL-SCNC: 4.3 MMOL/L — SIGNIFICANT CHANGE UP (ref 3.5–5.3)
PROT SERPL-MCNC: 7.2 G/DL — SIGNIFICANT CHANGE UP (ref 6–8.3)
RAPID RVP RESULT: SIGNIFICANT CHANGE UP
RBC # BLD: 3.84 M/UL — SIGNIFICANT CHANGE UP (ref 3.8–5.2)
RBC # FLD: 17 % — HIGH (ref 10.3–14.5)
RSV RNA SPEC QL NAA+PROBE: SIGNIFICANT CHANGE UP
RV+EV RNA SPEC QL NAA+PROBE: SIGNIFICANT CHANGE UP
S PNEUM AG UR QL: NEGATIVE — SIGNIFICANT CHANGE UP
SARS-COV-2 RNA SPEC QL NAA+PROBE: SIGNIFICANT CHANGE UP
SODIUM SERPL-SCNC: 135 MMOL/L — SIGNIFICANT CHANGE UP (ref 135–145)
WBC # BLD: 10.21 K/UL — SIGNIFICANT CHANGE UP (ref 3.8–10.5)
WBC # FLD AUTO: 10.21 K/UL — SIGNIFICANT CHANGE UP (ref 3.8–10.5)

## 2023-05-18 PROCEDURE — 99232 SBSQ HOSP IP/OBS MODERATE 35: CPT | Mod: GC

## 2023-05-18 RX ADMIN — Medication 650 MILLIGRAM(S): at 21:20

## 2023-05-18 RX ADMIN — Medication 1000 UNIT(S): at 11:06

## 2023-05-18 RX ADMIN — RIVAROXABAN 15 MILLIGRAM(S): KIT at 18:16

## 2023-05-18 RX ADMIN — Medication 100 MILLIGRAM(S): at 18:16

## 2023-05-18 RX ADMIN — Medication 100 MILLIGRAM(S): at 06:23

## 2023-05-18 RX ADMIN — Medication 650 MILLIGRAM(S): at 20:06

## 2023-05-18 RX ADMIN — Medication 650 MILLIGRAM(S): at 22:00

## 2023-05-18 RX ADMIN — RIVAROXABAN 15 MILLIGRAM(S): KIT at 10:07

## 2023-05-18 RX ADMIN — Medication 3 MILLIGRAM(S): at 21:20

## 2023-05-18 RX ADMIN — Medication 75 MICROGRAM(S): at 06:25

## 2023-05-18 RX ADMIN — CEFTRIAXONE 100 MILLIGRAM(S): 500 INJECTION, POWDER, FOR SOLUTION INTRAMUSCULAR; INTRAVENOUS at 18:15

## 2023-05-18 RX ADMIN — SENNA PLUS 1 TABLET(S): 8.6 TABLET ORAL at 21:19

## 2023-05-18 RX ADMIN — PANTOPRAZOLE SODIUM 40 MILLIGRAM(S): 20 TABLET, DELAYED RELEASE ORAL at 06:26

## 2023-05-18 RX ADMIN — Medication 100 MILLIGRAM(S): at 21:19

## 2023-05-18 RX ADMIN — Medication 650 MILLIGRAM(S): at 11:06

## 2023-05-18 NOTE — PROGRESS NOTE ADULT - PROBLEM SELECTOR PLAN 6
Noted systolic blood pressures persistently 160-170 during admission. No prior hx HTN, not on antihypertensives  - may be in setting of pain, acute stress  - asymptomatic  - monitor off anti-HTN meds for now  - PCP f/u

## 2023-05-18 NOTE — PROGRESS NOTE ADULT - SUBJECTIVE AND OBJECTIVE BOX
DATE OF SERVICE: 05-18-23 @ 07:39    Patient is a 72y old  Female who presents with a chief complaint of Malignancy workup (17 May 2023 15:34)      SUBJECTIVE / OVERNIGHT EVENTS:  Yesterday afternoon became febrile to 101.1 & unable to wean O2 due to desaturation. RVP negative. Sent cultures, started pt empirically on treatment for CAP  Afebrile overnight (Tm 99.8), hemodynamically stable. SpO2 92-94% on 4L NC  ***    MEDICATIONS  (STANDING):  azithromycin  IVPB      azithromycin  IVPB 500 milliGRAM(s) IV Intermittent every 24 hours  benzonatate 100 milliGRAM(s) Oral three times a day  cefTRIAXone   IVPB 1000 milliGRAM(s) IV Intermittent every 24 hours  cefTRIAXone   IVPB      cholecalciferol 1000 Unit(s) Oral daily  levothyroxine 75 MICROGram(s) Oral daily  melatonin 3 milliGRAM(s) Oral at bedtime  pantoprazole    Tablet 40 milliGRAM(s) Oral before breakfast  polyethylene glycol 3350 17 Gram(s) Oral at bedtime  rivaroxaban 15 milliGRAM(s) Oral two times a day with meals  senna 1 Tablet(s) Oral at bedtime    MEDICATIONS  (PRN):  acetaminophen     Tablet .. 650 milliGRAM(s) Oral every 6 hours PRN Temp greater or equal to 38C (100.4F), Mild Pain (1 - 3)  ketorolac 10 milliGRAM(s) Oral every 6 hours PRN Moderate Pain (4 - 6)  morphine  - Injectable 2 milliGRAM(s) IV Push every 6 hours PRN Severe Pain (7 - 10)      Vital Signs Last 24 Hrs  T(C): 37.7 (18 May 2023 06:08), Max: 38.4 (17 May 2023 14:15)  T(F): 99.8 (18 May 2023 06:08), Max: 101.1 (17 May 2023 14:15)  HR: 76 (18 May 2023 06:08) (63 - 76)  BP: 164/81 (18 May 2023 06:08) (159/72 - 174/79)  BP(mean): --  RR: 18 (18 May 2023 06:08) (18 - 19)  SpO2: 92% (18 May 2023 06:08) (92% - 94%)    Parameters below as of 18 May 2023 06:08  Patient On (Oxygen Delivery Method): nasal cannula  O2 Flow (L/min): 4    CAPILLARY BLOOD GLUCOSE        I&O's Summary    17 May 2023 07:01  -  18 May 2023 07:00  --------------------------------------------------------  IN: 175 mL / OUT: 0 mL / NET: 175 mL        PHYSICAL EXAM:  GENERAL: No apparent distress  HEAD:  Atraumatic, normocephalic  EYES: EOMI, PERRLA, conjunctiva and sclera clear  NECK: Supple, no JVD  CHEST/LUNG: Clear to auscultation bilaterally; non-labored respirations on 4L NC  HEART: Regular rate and rhythm; No murmurs, rubs, or gallops  ABDOMEN: Soft, nontender, nondistended; Bowel sounds present  EXTREMITIES:  2+ peripheral pulses; No clubbing, cyanosis, or edema  PSYCH: AAOx3  NEUROLOGY: No focal deficits  SKIN: No rashes or lesions      LABS:                        10.0   10.21 )-----------( 359      ( 18 May 2023 07:18 )             33.0     ***      RADIOLOGY & ADDITIONAL TESTS:  Reviewed    ***   DATE OF SERVICE: 05-18-23 @ 07:39    Patient is a 72y old  Female who presents with a chief complaint of Malignancy workup (17 May 2023 15:34)      SUBJECTIVE / OVERNIGHT EVENTS:  Yesterday afternoon became febrile to 101.1 & unable to wean O2 due to desaturation. RVP negative. Sent cultures, started pt empirically on treatment for CAP  Afebrile overnight but febrile to 101.9 this morning. Hemodynamically stable. SpO2 92-94% on 4L NC  Feels about the same today. Denies shortness of breath at rest or on ambulation with the supplemental oxygen in place. Denies dizziness, palpitations, chest discomfort, n/v/d, abd pain, dysuria, urinary frequency. Feels warm but no chills. Her appetite is poor but she has been drinking a good amount.    MEDICATIONS  (STANDING):  azithromycin  IVPB      azithromycin  IVPB 500 milliGRAM(s) IV Intermittent every 24 hours  benzonatate 100 milliGRAM(s) Oral three times a day  cefTRIAXone   IVPB 1000 milliGRAM(s) IV Intermittent every 24 hours  cefTRIAXone   IVPB      cholecalciferol 1000 Unit(s) Oral daily  levothyroxine 75 MICROGram(s) Oral daily  melatonin 3 milliGRAM(s) Oral at bedtime  pantoprazole    Tablet 40 milliGRAM(s) Oral before breakfast  polyethylene glycol 3350 17 Gram(s) Oral at bedtime  rivaroxaban 15 milliGRAM(s) Oral two times a day with meals  senna 1 Tablet(s) Oral at bedtime    MEDICATIONS  (PRN):  acetaminophen     Tablet .. 650 milliGRAM(s) Oral every 6 hours PRN Temp greater or equal to 38C (100.4F), Mild Pain (1 - 3)  ketorolac 10 milliGRAM(s) Oral every 6 hours PRN Moderate Pain (4 - 6)  morphine  - Injectable 2 milliGRAM(s) IV Push every 6 hours PRN Severe Pain (7 - 10)      Vital Signs Last 24 Hrs  T(C): 37.7 (18 May 2023 06:08), Max: 38.4 (17 May 2023 14:15)  T(F): 99.8 (18 May 2023 06:08), Max: 101.1 (17 May 2023 14:15)  HR: 76 (18 May 2023 06:08) (63 - 76)  BP: 164/81 (18 May 2023 06:08) (159/72 - 174/79)  BP(mean): --  RR: 18 (18 May 2023 06:08) (18 - 19)  SpO2: 92% (18 May 2023 06:08) (92% - 94%)    Parameters below as of 18 May 2023 06:08  Patient On (Oxygen Delivery Method): nasal cannula  O2 Flow (L/min): 4      I&O's Summary    17 May 2023 07:01  -  18 May 2023 07:00  --------------------------------------------------------  IN: 175 mL / OUT: 0 mL / NET: 175 mL        PHYSICAL EXAM:  GENERAL: No apparent distress  HEAD:  Atraumatic, normocephalic  EYES: EOMI, PERRLA, conjunctiva and sclera clear  NECK: Supple, no JVD  CHEST/LUNG: Clear to auscultation bilaterally; non-labored respirations on 4L NC  HEART: Regular rate and rhythm; No murmurs, rubs, or gallops  ABDOMEN: Soft, nontender, nondistended; Bowel sounds present  EXTREMITIES:  2+ peripheral pulses; No clubbing, cyanosis, or edema  PSYCH: AAOx3  NEUROLOGY: No focal deficits  SKIN: No rashes or lesions      LABS:                        10.0   10.21 )-----------( 359      ( 18 May 2023 07:18 )             33.0     05-18    135  |  98  |  8   ----------------------------<  117<H>  4.3   |  25  |  0.49<L>    Ca    9.4      18 May 2023 07:18  Phos  2.5     05-18  Mg     1.90     05-18    TPro  7.2  /  Alb  3.1<L>  /  TBili  0.8  /  DBili  x   /  AST  19  /  ALT  27  /  AlkPhos  336<H>  05-18        RADIOLOGY & ADDITIONAL TESTS:  Reviewed    < from: Xray Chest 1 View- PORTABLE-Urgent (Xray Chest 1 View- PORTABLE-Urgent .) (05.17.23 @ 17:39) >  FINDINGS:  Perihilar haze and mild interstitial edema are is present.    Left retrocardiac opacity obscuring the hemidiaphragm may represent lower   lobe pneumonia/atelectasis.    No effusions or pneumothorax.    COMPARISON: May 15,2023 CT chest and abdomen when the left lower lobe   was clear.    IMPRESSION: New, left lower lobe opacity concerning for pneumonia.    < end of copied text >

## 2023-05-18 NOTE — PROGRESS NOTE ADULT - ATTENDING COMMENTS
72 year old in midst of oncological workup (found to have lytic lesions concerning for malignancy, MM ruled out, concern for bone metastases with unknown primary), admitted with worsening LBP, found to have focal large nearly occlusive thrombus in the distal descending aorta, course complicated by pneumonia    Cough/Hypoxia:  Pt with hypoxia. fever 100.4, CT chest non-diagnostic, RVP negative x 2 (COVID exposure). CXR c/w pneunomia. on abx.  dc azithromax with negative legionella.  Will check sputum Cx    Pain: started on PRN pain regimen, pain controlled with tylenol/NSAIDs     Thrombus:  Started lovenox (CT head negative), Vascular Cardiology input appreciated. Transitioned to NOAC     Metastatic Disease with unknown Primary: O/p follow-up for further workup/treatment (Dr. Flores, will arrange for follow-up)    Anemia: s/p PRBCs as outpatient. Monitor        DC planning.

## 2023-05-18 NOTE — PROGRESS NOTE ADULT - PROBLEM SELECTOR PLAN 2
Undergoing active malignancy workup, initial bone biopsy report from 5/5 notable for poorly differentiated carcinoma. Currently pending additional immunostaining per path. Labs notable for elevated ALP likely in setting of lytic lesions  - known metastatic dz with bone mets  - per onc, final path indicates carcinoma of unknown primary origin with poorly differentiated histology  - IR does not place ports inpt due to infection rates, plan for outpt port placement after discharge   - onc recs appreciated

## 2023-05-18 NOTE — PROGRESS NOTE ADULT - PROBLEM SELECTOR PLAN 1
Noted desat to 80% on RA on 5/15, placed on 6L NC with improvement. Unable to wean to room air due to recurrent desaturation. Also reporting worsened cough (pt sometimes has cough in setting of reflux at home)  - developed fever on 5/17 to Tm 101.1  - given new fever, cough, hypoxia, concern for CAP  - RVP negative  - repeat RVP this morning  - started ceftriaxone (5/17-) and azithromycin (5/17-)  - f/u BCx, urine legionella  - sputum cx if able  - incentive spirometry  - wean O2 as tolerated Noted desat to 80% on RA on 5/15, placed on 6L NC with improvement. Unable to wean to room air due to recurrent desaturation. Also reporting worsened cough (pt sometimes has cough in setting of reflux at home)  - developed fever on 5/17 to Tm 101.1  - given new fever, cough, hypoxia, concern for CAP. New LLL opacity observed on 5/17 CXR (not seen on 5/15 CT)  - RVP negative  - started ceftriaxone (5/17-) and azithromycin (5/17-)  - f/u BCx, urine legionella  - repeat RVP this morning  - send sputum cx if able  - incentive spirometry  - guaifenesin, tessalon perles  - wean O2 as tolerated

## 2023-05-18 NOTE — PROGRESS NOTE ADULT - PROBLEM SELECTOR PLAN 3
Noted on CT CAP on admission to have focal, large, nearly occlusive thrombus in distal thoracic aorta. Obtained cardiac surgery eval in ED as they manage thoracic aorta thrombotic dz (not vascular surgery)  - no acute intervention recommended by cardiac surgery, thrombus is chronic appearing  - no contraindication to AC per heme  - CTH negative for bleed (noted new HA after starting Lovenox, which resolved)  - s/p Lovenox 5/15-5/16  - transitioned to Xarelto 5/17, vascular cards in agreement

## 2023-05-19 ENCOUNTER — APPOINTMENT (OUTPATIENT)
Dept: INFUSION THERAPY | Facility: HOSPITAL | Age: 73
End: 2023-05-19

## 2023-05-19 LAB
ALBUMIN SERPL ELPH-MCNC: 2.7 G/DL — LOW (ref 3.3–5)
ALP SERPL-CCNC: 269 U/L — HIGH (ref 40–120)
ALT FLD-CCNC: 22 U/L — SIGNIFICANT CHANGE UP (ref 4–33)
ANION GAP SERPL CALC-SCNC: 13 MMOL/L — SIGNIFICANT CHANGE UP (ref 7–14)
AST SERPL-CCNC: 17 U/L — SIGNIFICANT CHANGE UP (ref 4–32)
BILIRUB SERPL-MCNC: 0.5 MG/DL — SIGNIFICANT CHANGE UP (ref 0.2–1.2)
BUN SERPL-MCNC: 10 MG/DL — SIGNIFICANT CHANGE UP (ref 7–23)
CALCIUM SERPL-MCNC: 9 MG/DL — SIGNIFICANT CHANGE UP (ref 8.4–10.5)
CHLORIDE SERPL-SCNC: 99 MMOL/L — SIGNIFICANT CHANGE UP (ref 98–107)
CO2 SERPL-SCNC: 26 MMOL/L — SIGNIFICANT CHANGE UP (ref 22–31)
CREAT SERPL-MCNC: 0.47 MG/DL — LOW (ref 0.5–1.3)
EGFR: 101 ML/MIN/1.73M2 — SIGNIFICANT CHANGE UP
GLUCOSE SERPL-MCNC: 100 MG/DL — HIGH (ref 70–99)
HCT VFR BLD CALC: 29.4 % — LOW (ref 34.5–45)
HGB BLD-MCNC: 9 G/DL — LOW (ref 11.5–15.5)
INHIBIN B SERUM: <7 PG/ML — SIGNIFICANT CHANGE UP (ref 0–16.9)
MAGNESIUM SERPL-MCNC: 1.7 MG/DL — SIGNIFICANT CHANGE UP (ref 1.6–2.6)
MCHC RBC-ENTMCNC: 26.2 PG — LOW (ref 27–34)
MCHC RBC-ENTMCNC: 30.6 GM/DL — LOW (ref 32–36)
MCV RBC AUTO: 85.7 FL — SIGNIFICANT CHANGE UP (ref 80–100)
NRBC # BLD: 0 /100 WBCS — SIGNIFICANT CHANGE UP (ref 0–0)
NRBC # FLD: 0 K/UL — SIGNIFICANT CHANGE UP (ref 0–0)
PHOSPHATE SERPL-MCNC: 2.9 MG/DL — SIGNIFICANT CHANGE UP (ref 2.5–4.5)
PLATELET # BLD AUTO: 361 K/UL — SIGNIFICANT CHANGE UP (ref 150–400)
POTASSIUM SERPL-MCNC: 3.7 MMOL/L — SIGNIFICANT CHANGE UP (ref 3.5–5.3)
POTASSIUM SERPL-SCNC: 3.7 MMOL/L — SIGNIFICANT CHANGE UP (ref 3.5–5.3)
PROT SERPL-MCNC: 6.4 G/DL — SIGNIFICANT CHANGE UP (ref 6–8.3)
RBC # BLD: 3.43 M/UL — LOW (ref 3.8–5.2)
RBC # FLD: 16.8 % — HIGH (ref 10.3–14.5)
SODIUM SERPL-SCNC: 138 MMOL/L — SIGNIFICANT CHANGE UP (ref 135–145)
WBC # BLD: 9 K/UL — SIGNIFICANT CHANGE UP (ref 3.8–10.5)
WBC # FLD AUTO: 9 K/UL — SIGNIFICANT CHANGE UP (ref 3.8–10.5)

## 2023-05-19 PROCEDURE — 99233 SBSQ HOSP IP/OBS HIGH 50: CPT

## 2023-05-19 RX ORDER — KETOROLAC TROMETHAMINE 30 MG/ML
10 SYRINGE (ML) INJECTION EVERY 6 HOURS
Refills: 0 | Status: DISCONTINUED | OUTPATIENT
Start: 2023-05-19 | End: 2023-05-23

## 2023-05-19 RX ADMIN — CEFTRIAXONE 100 MILLIGRAM(S): 500 INJECTION, POWDER, FOR SOLUTION INTRAMUSCULAR; INTRAVENOUS at 18:41

## 2023-05-19 RX ADMIN — SENNA PLUS 1 TABLET(S): 8.6 TABLET ORAL at 21:23

## 2023-05-19 RX ADMIN — Medication 3 MILLIGRAM(S): at 21:23

## 2023-05-19 RX ADMIN — Medication 100 MILLIGRAM(S): at 21:23

## 2023-05-19 RX ADMIN — PANTOPRAZOLE SODIUM 40 MILLIGRAM(S): 20 TABLET, DELAYED RELEASE ORAL at 07:39

## 2023-05-19 RX ADMIN — Medication 75 MICROGRAM(S): at 07:39

## 2023-05-19 RX ADMIN — Medication 100 MILLIGRAM(S): at 07:40

## 2023-05-19 RX ADMIN — Medication 100 MILLIGRAM(S): at 17:43

## 2023-05-19 RX ADMIN — Medication 1000 UNIT(S): at 12:47

## 2023-05-19 RX ADMIN — Medication 10 MILLIGRAM(S): at 08:31

## 2023-05-19 RX ADMIN — RIVAROXABAN 15 MILLIGRAM(S): KIT at 07:40

## 2023-05-19 RX ADMIN — RIVAROXABAN 15 MILLIGRAM(S): KIT at 19:32

## 2023-05-19 NOTE — CHART NOTE - NSCHARTNOTEFT_GEN_A_CORE
Source: Patient [x ]    Family [ ]     other [ x] Chart review    Current Diet : Diet, Regular:   Kosher (05-15-23 @ 19:44) [Active]    Height (cm): 149.9 (15 May 2023 19:26)  Weight (kg): 49.6 (5/17), 48.5kg (5/15)  BMI (kg/m2): 22.1 (17 May 2023 16:15)    Nutrition Note:  Ms. Kiesha Rojas is a 72 year old female with PMH of newly diagnosed poorly differentiated carcinoma of unclear origin, hypothyroidism, psoriasis, admitted for further workup of new cancer & management of bone pain. Noted to have large non-occlusive distal thoracic aortic thrombus on CT, started on AC. Course c/b fever, hypoxia, initiated treatment for CAP, per chart.     Patient is seen for nutrition follow-up. Patient reports she gains back her appetite. Patient reports does not like food provided in hospital, encouraged to select meals on menus according to her preferences. Patient reports receiving food from grandson and family. Encouraged oral nutritional supplement consumption during visit. Patient denies any difficulty chewing/swallowing, any nausea, vomiting, diarrhea, constipation during visit, reports last bowel movement 5/18/2023. Noted patient with weight gain of +1.1kg/2.2%BW x 2days, favorable weight gain. Will continue to monitor weight trend. Patient is on cholecalciferol, for micronutrient coverage.     __________________ Pertinent Medications__________________   MEDICATIONS  (STANDING):  benzonatate 100 milliGRAM(s) Oral three times a day  cefTRIAXone   IVPB      cefTRIAXone   IVPB 1000 milliGRAM(s) IV Intermittent every 24 hours  cholecalciferol 1000 Unit(s) Oral daily  levothyroxine 75 MICROGram(s) Oral daily  melatonin 3 milliGRAM(s) Oral at bedtime  pantoprazole    Tablet 40 milliGRAM(s) Oral before breakfast  polyethylene glycol 3350 17 Gram(s) Oral at bedtime  rivaroxaban 15 milliGRAM(s) Oral two times a day with meals  senna 1 Tablet(s) Oral at bedtime    MEDICATIONS  (PRN):  acetaminophen     Tablet .. 650 milliGRAM(s) Oral every 6 hours PRN Temp greater or equal to 38C (100.4F), Mild Pain (1 - 3)  guaiFENesin Oral Liquid (Sugar-Free) 100 milliGRAM(s) Oral every 6 hours PRN Cough  ketorolac 10 milliGRAM(s) Oral every 6 hours PRN Severe Pain (7 - 10)      __________________ Pertinent Labs__________________   05-19 Na138 mmol/L Glu 100 mg/dL<H> K+ 3.7 mmol/L Cr  0.47 mg/dL<L> BUN 10 mg/dL 05-19 Phos 2.9 mg/dL 05-19 Alb 2.7 g/dL<L>      Edema: As per RN flow sheet, no edema noted at this time.   Skin: As per RN flow sheet, no pressure injury noted at this time.     Estimated Needs:   [x ] no change since previous assessment    Previous Nutrition Diagnosis:   [x ] Severe Malnutrition   Nutrition Diagnosis is [x ] ongoing   New Nutrition Diagnosis: [x ] not applicable      Interventions:     Recommend  [x ] Continue with current diet: Regular, Kosher. Diet remains appropriate at this time.   [ x] Nutrition Department continue to provide Orgain shake 11oz 2x/day (440kcal, 32gm protein) for nutrient support. Encourage patient to consume oral nutritional supplement.   [x ] Encourage PO intake and honor food preferences as able.      Monitoring and Evaluation:   [x ] PO intake [x ] Tolerance to diet prescription [x ] weights [ x] follow up per protocol  [x ] other: bowel movement, skin integrity, labs.

## 2023-05-19 NOTE — PROGRESS NOTE ADULT - PROBLEM SELECTOR PLAN 1
Noted desat to 80% on RA on 5/15, placed on 6L NC with improvement. Unable to wean to room air due to recurrent desaturation. Also reporting worsened cough (pt sometimes has cough in setting of reflux at home)  - developed fever on 5/17 to Tm 101.1  - given new fever, cough, hypoxia, concern for CAP. New LLL opacity observed on 5/17 CXR (not seen on 5/15 CT)  - RVP x2 negative  - started ceftriaxone (5/17-) and azithromycin (5/17)  - discontinued azithro as urine legionella negative  - BCx ngtd prelim  - send sputum cx if able  - incentive spirometry  - guaifenesin, tessalon perles  - wean O2 as tolerated Noted desat to 80% on RA on 5/15, placed on 6L NC with improvement. Unable to wean to room air due to recurrent desaturation. Also reporting worsened cough (pt sometimes has cough in setting of reflux at home)  - developed fever on 5/17 to Tm 101.1  - given new fever, cough, hypoxia, concern for CAP. New LLL opacity observed on 5/17 CXR (not seen on 5/15 CT)  - RVP x2 negative  - started ceftriaxone (5/17-) and azithromycin (5/17)  - discontinued azithro as urine legionella negative  - BCx ngtd prelim  - send sputum cx if able  - incentive spirometry  - guaifenesin, tessalon perles  - wean O2 as tolerated  - check ambulatory sats to assess whether pt will require home O2

## 2023-05-19 NOTE — PROGRESS NOTE ADULT - SUBJECTIVE AND OBJECTIVE BOX
DATE OF SERVICE: 05-19-23 @ 07:25    Patient is a 72y old  Female who presents with a chief complaint of Malignancy workup (18 May 2023 07:39)      SUBJECTIVE / OVERNIGHT EVENTS:  Tm 100 overnight, hemodynamically stable.   ***    MEDICATIONS  (STANDING):  benzonatate 100 milliGRAM(s) Oral three times a day  cefTRIAXone   IVPB 1000 milliGRAM(s) IV Intermittent every 24 hours  cefTRIAXone   IVPB      cholecalciferol 1000 Unit(s) Oral daily  levothyroxine 75 MICROGram(s) Oral daily  melatonin 3 milliGRAM(s) Oral at bedtime  pantoprazole    Tablet 40 milliGRAM(s) Oral before breakfast  polyethylene glycol 3350 17 Gram(s) Oral at bedtime  rivaroxaban 15 milliGRAM(s) Oral two times a day with meals  senna 1 Tablet(s) Oral at bedtime    MEDICATIONS  (PRN):  acetaminophen     Tablet .. 650 milliGRAM(s) Oral every 6 hours PRN Temp greater or equal to 38C (100.4F), Mild Pain (1 - 3)  guaiFENesin Oral Liquid (Sugar-Free) 100 milliGRAM(s) Oral every 6 hours PRN Cough  ketorolac 10 milliGRAM(s) Oral every 6 hours PRN Moderate Pain (4 - 6)  morphine  - Injectable 2 milliGRAM(s) IV Push every 6 hours PRN Severe Pain (7 - 10)      Vital Signs Last 24 Hrs  T(C): 37.8 (18 May 2023 21:21), Max: 38.8 (18 May 2023 11:00)  T(F): 100 (18 May 2023 21:21), Max: 101.9 (18 May 2023 11:00)  HR: 74 (18 May 2023 21:21) (61 - 74)  BP: 171/72 (18 May 2023 21:21) (135/68 - 171/72)  BP(mean): --  RR: 17 (18 May 2023 13:55) (17 - 17)  SpO2: 94% (18 May 2023 13:55) (94% - 94%)    Parameters below as of 18 May 2023 13:55  Patient On (Oxygen Delivery Method): nasal cannula      I&O's Summary    18 May 2023 07:01  -  19 May 2023 07:00  --------------------------------------------------------  IN: 550 mL / OUT: 0 mL / NET: 550 mL        PHYSICAL EXAM:  GENERAL: No apparent distress  HEAD:  Atraumatic, normocephalic  EYES: EOMI, PERRLA, conjunctiva and sclera clear  NECK: Supple, no JVD  CHEST/LUNG: Clear to auscultation bilaterally; non-labored respirations on 4L NC  HEART: Regular rate and rhythm; No murmurs, rubs, or gallops  ABDOMEN: Soft, nontender, nondistended; Bowel sounds present  EXTREMITIES:  2+ peripheral pulses; No clubbing, cyanosis, or edema  PSYCH: AAOx3  NEUROLOGY: No focal deficits  SKIN: No rashes or lesions      LABS:                        10.0   10.21 )-----------( 359      ( 18 May 2023 07:18 )             33.0     05-18    135  |  98  |  8   ----------------------------<  117<H>  4.3   |  25  |  0.49<L>    Ca    9.4      18 May 2023 07:18  Phos  2.5     05-18  Mg     1.90     05-18    TPro  7.2  /  Alb  3.1<L>  /  TBili  0.8  /  DBili  x   /  AST  19  /  ALT  27  /  AlkPhos  336<H>  05-18        RADIOLOGY & ADDITIONAL TESTS:  Reviewed    < from: Xray Chest 1 View- PORTABLE-Urgent (Xray Chest 1 View- PORTABLE-Urgent .) (05.17.23 @ 17:39) >  IMPRESSION: New, left lower lobe opacity concerning for pneumonia.    < end of copied text >   DATE OF SERVICE: 05-19-23 @ 07:25    Patient is a 72y old  Female who presents with a chief complaint of Malignancy workup (18 May 2023 07:39)      SUBJECTIVE / OVERNIGHT EVENTS:  Tm 100 overnight, hemodynamically stable. Pt reported shortness of breath after getting up to go to the bathroom overnight, increased to 5L with symptomatic improvement.  This am, pt denies SOB, reports improvement in cough, which remains non-productive. Denies diarrhea, n/v, dysuria, urinary frequency, chest discomfort      MEDICATIONS  (STANDING):  benzonatate 100 milliGRAM(s) Oral three times a day  cefTRIAXone   IVPB 1000 milliGRAM(s) IV Intermittent every 24 hours  cefTRIAXone   IVPB      cholecalciferol 1000 Unit(s) Oral daily  levothyroxine 75 MICROGram(s) Oral daily  melatonin 3 milliGRAM(s) Oral at bedtime  pantoprazole    Tablet 40 milliGRAM(s) Oral before breakfast  polyethylene glycol 3350 17 Gram(s) Oral at bedtime  rivaroxaban 15 milliGRAM(s) Oral two times a day with meals  senna 1 Tablet(s) Oral at bedtime    MEDICATIONS  (PRN):  acetaminophen     Tablet .. 650 milliGRAM(s) Oral every 6 hours PRN Temp greater or equal to 38C (100.4F), Mild Pain (1 - 3)  guaiFENesin Oral Liquid (Sugar-Free) 100 milliGRAM(s) Oral every 6 hours PRN Cough  ketorolac 10 milliGRAM(s) Oral every 6 hours PRN Moderate Pain (4 - 6)  morphine  - Injectable 2 milliGRAM(s) IV Push every 6 hours PRN Severe Pain (7 - 10)      Vital Signs Last 24 Hrs  T(C): 37.8 (18 May 2023 21:21), Max: 38.8 (18 May 2023 11:00)  T(F): 100 (18 May 2023 21:21), Max: 101.9 (18 May 2023 11:00)  HR: 74 (18 May 2023 21:21) (61 - 74)  BP: 171/72 (18 May 2023 21:21) (135/68 - 171/72)  BP(mean): --  RR: 17 (18 May 2023 13:55) (17 - 17)  SpO2: 94% (18 May 2023 13:55) (94% - 94%)    Parameters below as of 18 May 2023 13:55  Patient On (Oxygen Delivery Method): nasal cannula      I&O's Summary    18 May 2023 07:01  -  19 May 2023 07:00  --------------------------------------------------------  IN: 550 mL / OUT: 0 mL / NET: 550 mL        PHYSICAL EXAM:  GENERAL: No apparent distress  HEAD:  Atraumatic, normocephalic  EYES: PERRL, sclera clear  NECK: Supple, no JVD  CHEST/LUNG: L-sided crackles, good air entry, non-labored respirations on 5L NC  HEART: Regular rate and rhythm; No murmurs, rubs, or gallops  ABDOMEN: Soft, nontender, nondistended; Bowel sounds present  EXTREMITIES:  No peripheral edema  PSYCH: AAOx3  NEUROLOGY: No focal deficits  SKIN: No rashes or lesions      LABS:                                 9.0    9.00  )-----------( 361      ( 19 May 2023 05:20 )             29.4     05-19    138  |  99  |  10  ----------------------------<  100<H>  3.7   |  26  |  0.47<L>    Ca    9.0      19 May 2023 05:20  Phos  2.9     05-19  Mg     1.70     05-19    TPro  6.4  /  Alb  2.7<L>  /  TBili  0.5  /  DBili  x   /  AST  17  /  ALT  22  /  AlkPhos  269<H>  05-19      RADIOLOGY & ADDITIONAL TESTS:  Reviewed    < from: Xray Chest 1 View- PORTABLE-Urgent (Xray Chest 1 View- PORTABLE-Urgent .) (05.17.23 @ 17:39) >  IMPRESSION: New, left lower lobe opacity concerning for pneumonia.    < end of copied text >   DATE OF SERVICE: 05-19-23 @ 07:25    Patient is a 72y old  Female who presents with a chief complaint of Malignancy workup (18 May 2023 07:39)      SUBJECTIVE / OVERNIGHT EVENTS:  Tm 100 overnight, hemodynamically stable. Pt reported shortness of breath after getting up to go to the bathroom overnight, increased to 5L with symptomatic improvement.  This am, pt denies SOB, reports improvement in cough, which remains non-productive. Denies diarrhea, n/v, dysuria, urinary frequency, chest discomfort  She does report 8/10 back pain however does not want the ordered prn morphine for severe pain. Would rather try PO toradol. Had pain relief with toradol.      MEDICATIONS  (STANDING):  benzonatate 100 milliGRAM(s) Oral three times a day  cefTRIAXone   IVPB 1000 milliGRAM(s) IV Intermittent every 24 hours  cefTRIAXone   IVPB      cholecalciferol 1000 Unit(s) Oral daily  levothyroxine 75 MICROGram(s) Oral daily  melatonin 3 milliGRAM(s) Oral at bedtime  pantoprazole    Tablet 40 milliGRAM(s) Oral before breakfast  polyethylene glycol 3350 17 Gram(s) Oral at bedtime  rivaroxaban 15 milliGRAM(s) Oral two times a day with meals  senna 1 Tablet(s) Oral at bedtime    MEDICATIONS  (PRN):  acetaminophen     Tablet .. 650 milliGRAM(s) Oral every 6 hours PRN Temp greater or equal to 38C (100.4F), Mild Pain (1 - 3)  guaiFENesin Oral Liquid (Sugar-Free) 100 milliGRAM(s) Oral every 6 hours PRN Cough  ketorolac 10 milliGRAM(s) Oral every 6 hours PRN Moderate Pain (4 - 6)  morphine  - Injectable 2 milliGRAM(s) IV Push every 6 hours PRN Severe Pain (7 - 10)      Vital Signs Last 24 Hrs  T(C): 37.8 (18 May 2023 21:21), Max: 38.8 (18 May 2023 11:00)  T(F): 100 (18 May 2023 21:21), Max: 101.9 (18 May 2023 11:00)  HR: 74 (18 May 2023 21:21) (61 - 74)  BP: 171/72 (18 May 2023 21:21) (135/68 - 171/72)  BP(mean): --  RR: 17 (18 May 2023 13:55) (17 - 17)  SpO2: 94% (18 May 2023 13:55) (94% - 94%)    Parameters below as of 18 May 2023 13:55  Patient On (Oxygen Delivery Method): nasal cannula      I&O's Summary    18 May 2023 07:01  -  19 May 2023 07:00  --------------------------------------------------------  IN: 550 mL / OUT: 0 mL / NET: 550 mL        PHYSICAL EXAM:  GENERAL: No apparent distress  HEAD:  Atraumatic, normocephalic  EYES: PERRL, sclera clear  NECK: Supple, no JVD  CHEST/LUNG: L-sided crackles, good air entry, non-labored respirations on 5L NC  HEART: Regular rate and rhythm; No murmurs, rubs, or gallops  ABDOMEN: Soft, nontender, nondistended; Bowel sounds present  EXTREMITIES:  No peripheral edema  PSYCH: AAOx3  NEUROLOGY: No focal deficits  SKIN: No rashes or lesions      LABS:                                 9.0    9.00  )-----------( 361      ( 19 May 2023 05:20 )             29.4     05-19    138  |  99  |  10  ----------------------------<  100<H>  3.7   |  26  |  0.47<L>    Ca    9.0      19 May 2023 05:20  Phos  2.9     05-19  Mg     1.70     05-19    TPro  6.4  /  Alb  2.7<L>  /  TBili  0.5  /  DBili  x   /  AST  17  /  ALT  22  /  AlkPhos  269<H>  05-19      RADIOLOGY & ADDITIONAL TESTS:  Reviewed    < from: Xray Chest 1 View- PORTABLE-Urgent (Xray Chest 1 View- PORTABLE-Urgent .) (05.17.23 @ 17:39) >  IMPRESSION: New, left lower lobe opacity concerning for pneumonia.    < end of copied text >

## 2023-05-19 NOTE — PROGRESS NOTE ADULT - PROBLEM SELECTOR PLAN 4
Pt endorses taking Tylenol & Advil alternating q3-4 hours at home to try and control her pain. Will start pain regimen & assess need  - PO Tylenol for mild pain  - PO Toradol for moderate pain  - IV morphine 2 mg q6h prn for severe pain Pt endorses taking Tylenol & Advil alternating q3-4 hours at home to try and control her pain. Will start pain regimen & assess need  - PO Tylenol for mild pain  - PO Toradol for moderate to severe pain  - Pt refusing opioids

## 2023-05-19 NOTE — PROGRESS NOTE ADULT - ATTENDING COMMENTS
72 year old in midst of oncological workup (found to have lytic lesions concerning for malignancy, MM ruled out, concern for bone metastases with unknown primary), admitted with worsening LBP, found to have focal large nearly occlusive thrombus in the distal descending aorta, course complicated by pneumonia, NOw reqquiring O2 now 72 year old in midst of oncological workup (found to have lytic lesions concerning for malignancy, MM ruled out, concern for bone metastases with unknown primary), admitted with worsening LBP, found to have focal large nearly occlusive thrombus in the distal descending aorta, course complicated by pneumonia, Now requiring O2 on 5L NC. CXR with left lobe opacity consistent with PNA, on CTX for empiric treatment. Will likely need a 7-10 day course of abx. Urine legionella negative, azithromycin d/cd. Desatted to 82% on RA and 92% on 3L NC will likely need O2 at home. Bcx X2 NGTD.    Also with aortic thrombus, now on DOAC per vascular cards recs.    Malignancy w/u: to be completed as outpatient at [facility] with Dr. Flores

## 2023-05-20 ENCOUNTER — TRANSCRIPTION ENCOUNTER (OUTPATIENT)
Age: 73
End: 2023-05-20

## 2023-05-20 LAB
ALBUMIN SERPL ELPH-MCNC: 2.8 G/DL — LOW (ref 3.3–5)
ALP SERPL-CCNC: 243 U/L — HIGH (ref 40–120)
ALT FLD-CCNC: 22 U/L — SIGNIFICANT CHANGE UP (ref 4–33)
ANION GAP SERPL CALC-SCNC: 11 MMOL/L — SIGNIFICANT CHANGE UP (ref 7–14)
AST SERPL-CCNC: 17 U/L — SIGNIFICANT CHANGE UP (ref 4–32)
BASOPHILS # BLD AUTO: 0.03 K/UL — SIGNIFICANT CHANGE UP (ref 0–0.2)
BASOPHILS NFR BLD AUTO: 0.4 % — SIGNIFICANT CHANGE UP (ref 0–2)
BILIRUB SERPL-MCNC: 0.5 MG/DL — SIGNIFICANT CHANGE UP (ref 0.2–1.2)
BUN SERPL-MCNC: 11 MG/DL — SIGNIFICANT CHANGE UP (ref 7–23)
CALCIUM SERPL-MCNC: 8.9 MG/DL — SIGNIFICANT CHANGE UP (ref 8.4–10.5)
CHLORIDE SERPL-SCNC: 98 MMOL/L — SIGNIFICANT CHANGE UP (ref 98–107)
CO2 SERPL-SCNC: 28 MMOL/L — SIGNIFICANT CHANGE UP (ref 22–31)
CREAT SERPL-MCNC: 0.48 MG/DL — LOW (ref 0.5–1.3)
EGFR: 101 ML/MIN/1.73M2 — SIGNIFICANT CHANGE UP
EOSINOPHIL # BLD AUTO: 0.06 K/UL — SIGNIFICANT CHANGE UP (ref 0–0.5)
EOSINOPHIL NFR BLD AUTO: 0.9 % — SIGNIFICANT CHANGE UP (ref 0–6)
GLUCOSE SERPL-MCNC: 109 MG/DL — HIGH (ref 70–99)
HCT VFR BLD CALC: 32.3 % — LOW (ref 34.5–45)
HGB BLD-MCNC: 9.9 G/DL — LOW (ref 11.5–15.5)
IANC: 5.43 K/UL — SIGNIFICANT CHANGE UP (ref 1.8–7.4)
IMM GRANULOCYTES NFR BLD AUTO: 0.9 % — SIGNIFICANT CHANGE UP (ref 0–0.9)
LYMPHOCYTES # BLD AUTO: 0.58 K/UL — LOW (ref 1–3.3)
LYMPHOCYTES # BLD AUTO: 8.4 % — LOW (ref 13–44)
MAGNESIUM SERPL-MCNC: 1.6 MG/DL — SIGNIFICANT CHANGE UP (ref 1.6–2.6)
MCHC RBC-ENTMCNC: 26.8 PG — LOW (ref 27–34)
MCHC RBC-ENTMCNC: 30.7 GM/DL — LOW (ref 32–36)
MCV RBC AUTO: 87.3 FL — SIGNIFICANT CHANGE UP (ref 80–100)
MONOCYTES # BLD AUTO: 0.73 K/UL — SIGNIFICANT CHANGE UP (ref 0–0.9)
MONOCYTES NFR BLD AUTO: 10.6 % — SIGNIFICANT CHANGE UP (ref 2–14)
NEUTROPHILS # BLD AUTO: 5.43 K/UL — SIGNIFICANT CHANGE UP (ref 1.8–7.4)
NEUTROPHILS NFR BLD AUTO: 78.8 % — HIGH (ref 43–77)
NRBC # BLD: 0 /100 WBCS — SIGNIFICANT CHANGE UP (ref 0–0)
NRBC # FLD: 0 K/UL — SIGNIFICANT CHANGE UP (ref 0–0)
PHOSPHATE SERPL-MCNC: 3 MG/DL — SIGNIFICANT CHANGE UP (ref 2.5–4.5)
PLATELET # BLD AUTO: 366 K/UL — SIGNIFICANT CHANGE UP (ref 150–400)
POTASSIUM SERPL-MCNC: 3.4 MMOL/L — LOW (ref 3.5–5.3)
POTASSIUM SERPL-SCNC: 3.4 MMOL/L — LOW (ref 3.5–5.3)
PROT SERPL-MCNC: 6.2 G/DL — SIGNIFICANT CHANGE UP (ref 6–8.3)
RBC # BLD: 3.7 M/UL — LOW (ref 3.8–5.2)
RBC # FLD: 16.6 % — HIGH (ref 10.3–14.5)
SODIUM SERPL-SCNC: 137 MMOL/L — SIGNIFICANT CHANGE UP (ref 135–145)
WBC # BLD: 6.89 K/UL — SIGNIFICANT CHANGE UP (ref 3.8–10.5)
WBC # FLD AUTO: 6.89 K/UL — SIGNIFICANT CHANGE UP (ref 3.8–10.5)

## 2023-05-20 PROCEDURE — 99233 SBSQ HOSP IP/OBS HIGH 50: CPT

## 2023-05-20 RX ORDER — MAGNESIUM SULFATE 500 MG/ML
1 VIAL (ML) INJECTION ONCE
Refills: 0 | Status: COMPLETED | OUTPATIENT
Start: 2023-05-20 | End: 2023-05-20

## 2023-05-20 RX ORDER — AMLODIPINE BESYLATE 2.5 MG/1
2.5 TABLET ORAL DAILY
Refills: 0 | Status: DISCONTINUED | OUTPATIENT
Start: 2023-05-20 | End: 2023-05-23

## 2023-05-20 RX ORDER — SODIUM CHLORIDE 9 MG/ML
100 INJECTION INTRAMUSCULAR; INTRAVENOUS; SUBCUTANEOUS ONCE
Refills: 0 | Status: COMPLETED | OUTPATIENT
Start: 2023-05-20 | End: 2023-05-20

## 2023-05-20 RX ORDER — SODIUM CHLORIDE 0.65 %
1 AEROSOL, SPRAY (ML) NASAL EVERY 8 HOURS
Refills: 0 | Status: DISCONTINUED | OUTPATIENT
Start: 2023-05-20 | End: 2023-05-23

## 2023-05-20 RX ORDER — POTASSIUM CHLORIDE 20 MEQ
40 PACKET (EA) ORAL ONCE
Refills: 0 | Status: COMPLETED | OUTPATIENT
Start: 2023-05-20 | End: 2023-05-20

## 2023-05-20 RX ADMIN — Medication 10 MILLIGRAM(S): at 00:38

## 2023-05-20 RX ADMIN — Medication 10 MILLIGRAM(S): at 21:43

## 2023-05-20 RX ADMIN — SODIUM CHLORIDE 100 MILLILITER(S): 9 INJECTION INTRAMUSCULAR; INTRAVENOUS; SUBCUTANEOUS at 09:13

## 2023-05-20 RX ADMIN — AMLODIPINE BESYLATE 2.5 MILLIGRAM(S): 2.5 TABLET ORAL at 10:26

## 2023-05-20 RX ADMIN — CEFTRIAXONE 100 MILLIGRAM(S): 500 INJECTION, POWDER, FOR SOLUTION INTRAMUSCULAR; INTRAVENOUS at 14:44

## 2023-05-20 RX ADMIN — Medication 10 MILLIGRAM(S): at 14:30

## 2023-05-20 RX ADMIN — Medication 100 MILLIGRAM(S): at 13:39

## 2023-05-20 RX ADMIN — Medication 10 MILLIGRAM(S): at 20:43

## 2023-05-20 RX ADMIN — Medication 10 MILLIGRAM(S): at 13:39

## 2023-05-20 RX ADMIN — Medication 40 MILLIEQUIVALENT(S): at 08:32

## 2023-05-20 RX ADMIN — Medication 75 MICROGRAM(S): at 05:29

## 2023-05-20 RX ADMIN — RIVAROXABAN 15 MILLIGRAM(S): KIT at 09:09

## 2023-05-20 RX ADMIN — Medication 100 MILLIGRAM(S): at 01:30

## 2023-05-20 RX ADMIN — Medication 10 MILLIGRAM(S): at 01:38

## 2023-05-20 RX ADMIN — Medication 1 SPRAY(S): at 05:30

## 2023-05-20 RX ADMIN — PANTOPRAZOLE SODIUM 40 MILLIGRAM(S): 20 TABLET, DELAYED RELEASE ORAL at 06:23

## 2023-05-20 RX ADMIN — Medication 1000 UNIT(S): at 13:38

## 2023-05-20 RX ADMIN — Medication 3 MILLIGRAM(S): at 21:44

## 2023-05-20 RX ADMIN — Medication 100 GRAM(S): at 08:32

## 2023-05-20 RX ADMIN — SENNA PLUS 1 TABLET(S): 8.6 TABLET ORAL at 21:44

## 2023-05-20 RX ADMIN — RIVAROXABAN 15 MILLIGRAM(S): KIT at 18:22

## 2023-05-20 RX ADMIN — Medication 100 MILLIGRAM(S): at 05:29

## 2023-05-20 RX ADMIN — Medication 100 MILLIGRAM(S): at 21:44

## 2023-05-20 NOTE — DISCHARGE NOTE PROVIDER - PROVIDER TOKENS
PROVIDER:[TOKEN:[[id number]],FOLLOWUP:[2 weeks],ESTABLISHEDPATIENT:[T]],PROVIDER:[TOKEN:[[id number]],FOLLOWUP:[1 week],ESTABLISHEDPATIENT:[T]]

## 2023-05-20 NOTE — DISCHARGE NOTE PROVIDER - DETAILS OF MALNUTRITION DIAGNOSIS/DIAGNOSES
This patient has been assessed with a concern for Malnutrition and was treated during this hospitalization for the following Nutrition diagnosis/diagnoses:     -  05/16/2023: Severe protein-calorie malnutrition   -  05/16/2023: Underweight (BMI < 19)

## 2023-05-20 NOTE — DISCHARGE NOTE PROVIDER - NSDCMRMEDTOKEN_GEN_ALL_CORE_FT
D3 25 mcg (1000 intl units) oral tablet: 1 tab(s) orally once a day  pantoprazole 40 mg oral delayed release tablet: 1 tab(s) orally once a day  Synthroid 75 mcg (0.075 mg) oral tablet: 1 orally once a day   D3 25 mcg (1000 intl units) oral tablet: 1 tab(s) orally once a day  levoFLOXacin 750 mg oral tablet: 1 tab(s) orally once a day  Lidocare Pain Relief Patch 4% topical film: Apply topically to affected area once a day as needed for  moderate pain  Norvasc 2.5 mg oral tablet: 1 tab(s) orally once a day  pantoprazole 40 mg oral delayed release tablet: 1 tab(s) orally once a day  Synthroid 75 mcg (0.075 mg) oral tablet: 1 orally once a day  Xarelto Starter Pack 15 mg-20 mg oral kit: 1 tab(s) orally 2 times a day

## 2023-05-20 NOTE — PROGRESS NOTE ADULT - PROBLEM SELECTOR PLAN 6
Noted systolic blood pressures persistently 160-170 during admission. No prior hx HTN, not on antihypertensives  - may be in setting of pain, acute stress  - asymptomatic  - monitor off anti-HTN meds for now  - PCP f/u Noted systolic blood pressures persistently 160-170 during admission. No prior hx HTN, not on antihypertensives  - may be in setting of pain, acute stress  - asymptomatic  - will start 2.5 amlodipine today due to persistent elevation  - PCP f/u

## 2023-05-20 NOTE — PROGRESS NOTE ADULT - SUBJECTIVE AND OBJECTIVE BOX
DATE OF SERVICE: 05-20-23 @ 07:48    Patient is a 72y old  Female who presents with a chief complaint of Malignancy workup (19 May 2023 07:25)      SUBJECTIVE / OVERNIGHT EVENTS:  No acute events overnight. Scant nose bleed noted, pt placed on humidified O2 & saline spray added.  Afebrile, stable BPs in 160s systolic. SpO2 94-96% on 5L NC. Ambulatory sats documented this morning, 94% on 5L and 87% on room air.  ***    MEDICATIONS  (STANDING):  benzonatate 100 milliGRAM(s) Oral three times a day  cefTRIAXone   IVPB      cefTRIAXone   IVPB 1000 milliGRAM(s) IV Intermittent every 24 hours  cholecalciferol 1000 Unit(s) Oral daily  levothyroxine 75 MICROGram(s) Oral daily  melatonin 3 milliGRAM(s) Oral at bedtime  pantoprazole    Tablet 40 milliGRAM(s) Oral before breakfast  polyethylene glycol 3350 17 Gram(s) Oral at bedtime  rivaroxaban 15 milliGRAM(s) Oral two times a day with meals  senna 1 Tablet(s) Oral at bedtime    MEDICATIONS  (PRN):  acetaminophen     Tablet .. 650 milliGRAM(s) Oral every 6 hours PRN Temp greater or equal to 38C (100.4F), Mild Pain (1 - 3)  guaiFENesin Oral Liquid (Sugar-Free) 100 milliGRAM(s) Oral every 6 hours PRN Cough  ketorolac 10 milliGRAM(s) Oral every 6 hours PRN Severe Pain (7 - 10)  sodium chloride 0.65% Nasal 1 Spray(s) Both Nostrils every 8 hours PRN Nasal Congestion      Vital Signs Last 24 Hrs  T(C): 36.6 (20 May 2023 05:35), Max: 37 (19 May 2023 22:00)  T(F): 97.9 (20 May 2023 05:35), Max: 98.6 (19 May 2023 22:00)  HR: 68 (20 May 2023 05:35) (68 - 78)  BP: 165/75 (20 May 2023 05:35) (162/82 - 172/82)  BP(mean): --  RR: 19 (20 May 2023 06:35) (18 - 20)  SpO2: 87% (20 May 2023 06:35) (82% - 96%)    Parameters below as of 20 May 2023 06:35  Patient On (Oxygen Delivery Method): room air      CAPILLARY BLOOD GLUCOSE        I&O's Summary    19 May 2023 07:01  -  20 May 2023 07:00  --------------------------------------------------------  IN: 850 mL / OUT: 0 mL / NET: 850 mL        PHYSICAL EXAM:  GENERAL: No apparent distress  HEAD:  Atraumatic, normocephalic  EYES: PERRL, sclera clear  NECK: Supple, no JVD  CHEST/LUNG: L-sided crackles, good air entry, non-labored respirations on 5L NC  HEART: Regular rate and rhythm; No murmurs, rubs, or gallops  ABDOMEN: Soft, nontender, nondistended; Bowel sounds present  EXTREMITIES:  No peripheral edema  PSYCH: AAOx3  NEUROLOGY: No focal deficits  SKIN: No rashes or lesions      LABS:             ***      RADIOLOGY & ADDITIONAL TESTS:  Reviewed     DATE OF SERVICE: 05-20-23 @ 07:48    Patient is a 72y old  Female who presents with a chief complaint of Malignancy workup (19 May 2023 07:25)      SUBJECTIVE / OVERNIGHT EVENTS:  No acute events overnight. Scant nose bleed noted, pt placed on humidified O2 & saline spray added.  Afebrile, stable BPs in 160s systolic. SpO2 94-96% on 5L NC. Ambulatory sats documented this morning, 94% on 5L and 87% on room air.  Feels improved today. Denies cough. Intermittently short of breath on exertion, but not at rest. Denies chest pain, abd pain, diarrhea, n/v, dysuria    MEDICATIONS  (STANDING):  benzonatate 100 milliGRAM(s) Oral three times a day  cefTRIAXone   IVPB      cefTRIAXone   IVPB 1000 milliGRAM(s) IV Intermittent every 24 hours  cholecalciferol 1000 Unit(s) Oral daily  levothyroxine 75 MICROGram(s) Oral daily  melatonin 3 milliGRAM(s) Oral at bedtime  pantoprazole    Tablet 40 milliGRAM(s) Oral before breakfast  polyethylene glycol 3350 17 Gram(s) Oral at bedtime  rivaroxaban 15 milliGRAM(s) Oral two times a day with meals  senna 1 Tablet(s) Oral at bedtime    MEDICATIONS  (PRN):  acetaminophen     Tablet .. 650 milliGRAM(s) Oral every 6 hours PRN Temp greater or equal to 38C (100.4F), Mild Pain (1 - 3)  guaiFENesin Oral Liquid (Sugar-Free) 100 milliGRAM(s) Oral every 6 hours PRN Cough  ketorolac 10 milliGRAM(s) Oral every 6 hours PRN Severe Pain (7 - 10)  sodium chloride 0.65% Nasal 1 Spray(s) Both Nostrils every 8 hours PRN Nasal Congestion      Vital Signs Last 24 Hrs  T(C): 36.6 (20 May 2023 05:35), Max: 37 (19 May 2023 22:00)  T(F): 97.9 (20 May 2023 05:35), Max: 98.6 (19 May 2023 22:00)  HR: 68 (20 May 2023 05:35) (68 - 78)  BP: 165/75 (20 May 2023 05:35) (162/82 - 172/82)  BP(mean): --  RR: 19 (20 May 2023 06:35) (18 - 20)  SpO2: 87% (20 May 2023 06:35) (82% - 96%)    Parameters below as of 20 May 2023 06:35  Patient On (Oxygen Delivery Method): room air      CAPILLARY BLOOD GLUCOSE        I&O's Summary    19 May 2023 07:01  -  20 May 2023 07:00  --------------------------------------------------------  IN: 850 mL / OUT: 0 mL / NET: 850 mL        PHYSICAL EXAM:  GENERAL: No apparent distress  HEAD:  Atraumatic, normocephalic  EYES: PERRL, sclera clear  NECK: Supple, no JVD  CHEST/LUNG: Bibasilar crackles, good air entry, non-labored respirations on 5L NC  HEART: Regular rate and rhythm; No murmurs, rubs, or gallops  ABDOMEN: Soft, nontender, nondistended; Bowel sounds present  EXTREMITIES:  No peripheral edema  PSYCH: AAOx3  NEUROLOGY: No focal deficits  SKIN: No rashes or lesions      LABS:                                 9.9    6.89  )-----------( 366      ( 20 May 2023 06:57 )             32.3     05-20    137  |  98  |  11  ----------------------------<  109<H>  3.4<L>   |  28  |  0.48<L>    Ca    8.9      20 May 2023 06:57  Phos  3.0     05-20  Mg     1.60     05-20    TPro  6.2  /  Alb  2.8<L>  /  TBili  0.5  /  DBili  x   /  AST  17  /  ALT  22  /  AlkPhos  243<H>  05-20        RADIOLOGY & ADDITIONAL TESTS:  Reviewed

## 2023-05-20 NOTE — PROGRESS NOTE ADULT - PROBLEM SELECTOR PLAN 4
Pt endorses taking Tylenol & Advil alternating q3-4 hours at home to try and control her pain. Will start pain regimen & assess need  - PO Tylenol for mild pain  - PO Toradol for moderate to severe pain  - Pt refusing opioids

## 2023-05-20 NOTE — PROGRESS NOTE ADULT - PROBLEM SELECTOR PLAN 1
Noted desat to 80% on RA on 5/15, placed on 6L NC with improvement. Unable to wean to room air due to recurrent desaturation. Also reporting worsened cough (pt sometimes has cough in setting of reflux at home)  - developed fever on 5/17 to Tm 101.1  - given new fever, cough, hypoxia, concern for CAP. New LLL opacity observed on 5/17 CXR (not seen on 5/15 CT)  - RVP x2 negative  - started ceftriaxone (5/17-) and azithromycin (5/17)  - discontinued azithro as urine legionella negative  - BCx ngtd prelim  - send sputum cx if able  - incentive spirometry  - guaifenesin, tessalon perles  - wean O2 as tolerated  - check ambulatory sats to assess whether pt will require home O2 Noted desat to 80% on RA on 5/15, placed on 6L NC with improvement. Unable to wean to room air due to recurrent desaturation. Also reporting worsened cough (pt sometimes has cough in setting of reflux at home)  - developed fever on 5/17 to Tm 101.1  - given new fever, cough, hypoxia, concern for CAP. New LLL opacity observed on 5/17 CXR (not seen on 5/15 CT)  - RVP x2 negative  - started ceftriaxone (5/17-) and azithromycin (5/17, d/scott after urine Legionella negative)  - BCx ngtd prelim  - send sputum cx if able  - incentive spirometry  - guaifenesin, tessalon perles  - wean O2 as tolerated  - daily ambulatory sats

## 2023-05-20 NOTE — DISCHARGE NOTE PROVIDER - NSDCCPTREATMENT_GEN_ALL_CORE_FT
PRINCIPAL PROCEDURE  Procedure: CT chest, abdomen and pelvis  Findings and Treatment: 5/15/23  FINDINGS:  CHEST:  LUNGS AND LARGE AIRWAYS: Patent central airways. No pulmonary nodule.   Bibasilar subsegmental atelectasis.  PLEURA: No pleural effusion.  VESSELS: Focal irregular thrombus in the distal descending thoracic aorta   resulting in severe luminal narrowing spanning approximately 5.4 cm in   craniocaudal dimension.  HEART: Heart size is normal. No pericardial effusion.  MEDIASTINUM AND LESVIA: No lymphadenopathy.  CHEST WALL AND LOWER NECK: Within normal limits.  ABDOMEN AND PELVIS:  LIVER: Hepatomegaly. Heterogeneous enhancement.  BILE DUCTS: Normal caliber.  GALLBLADDER: Pericholecystic fluid.  SPLEEN: Few wedge-shaped hypodensities in the superior and inferior   poles, compatible with infarcts.  PANCREAS: Within normal limits.  ADRENALS: Within normal limits.  KIDNEYS/URETERS: Symmetric enhancement. No hydronephrosis.  BLADDER: Within normal limits.  REPRODUCTIVE ORGANS: Fibroid uterus.  BOWEL: No bowel obstruction. Appendix is not visualized. Colonic   diverticulosis without acute diverticulitis.  PERITONEUM: Small volume perihepatic and pelvic ascites.  VESSELS: Atherosclerotic calcification. Nonopacification of the hepatic   veins.  RETROPERITONEUM/LYMPH NODES: No lymphadenopathy.  ABDOMINAL WALL: Within normal limits.  BONES: Multiple lytic lesions in the spine involving the T9, T10, T12,   and L2 vertebral bodies,and T11 right L2 pedicle. Additional smaller   lytic lesions in the right sacrum and left iliac wing.  IMPRESSION:  Focal large nearly occlusive thrombus in the distal descending aorta.  Splenic infarcts.  Hepatomegaly and nonspecific perihepatic and pericholecystic fluid.   Heterogeneous enhancement of the liver and nonopacification of the hepatic veins, which may be related to early contrast timing. Consider duplex ultrasound of the liver to evaluate to exclude hepatic vein thrombus.  Multiple osseous lytic lesions in the spine and pelvis, concerning for   metastases.      SECONDARY PROCEDURE  Procedure: CXR, single AP view  Findings and Treatment: 5/17/23  FINDINGS:  Perihilar haze and mild interstitial edema are is present.  Left retrocardiac opacity obscuring the hemidiaphragm may represent lower   lobe pneumonia/atelectasis.  No effusions or pneumothorax.  COMPARISON: May 15,2023 CT chest and abdomen when the left lower lobe   was clear.  IMPRESSION: New, left lower lobe opacity concerning for pneumonia.    Procedure: CT head wo con  Findings and Treatment: 5/16/23  FINDINGS:  No acute hemorrhage, hydrocephalus, midline shift or   extra-axial collections are identified. Age-appropriate involutional and   mild microvascular ischemic changes are present.  No destructive lesions of the skull are noted.  The orbits are not remarkable in appearance.  The visualized paranasal sinuses and tympanomastoid cavities are free of   acute disease.  IMPRESSION:  No acute hemorrhage, mass effect or calvarial lesion.

## 2023-05-20 NOTE — DISCHARGE NOTE PROVIDER - CARE PROVIDER_API CALL
Milad Castañeda)  Gastroenterology; Internal Medicine  [address] [facility], 3rd [place] [address]  Phone: [phone]  Fax: [phone]  Established Patient  Follow Up Time: 2 weeks    Baltazar Carcamo; PhD)  Hematology; Medical Oncology  [address] [place] [address]  Phone: [phone]  Fax: [phone]  Established Patient  Follow Up Time: 1 week

## 2023-05-20 NOTE — DISCHARGE NOTE PROVIDER - HOSPITAL COURSE
DISCHARGE SUMMARY    Ms. 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 CT redemonstrated osseous lytic lesions in T9, T10, T11, T12, and L2. Pt evaluated by oncology consult service, who recommended outpatient follow-up for port placement and initiation of palliative therapy. Pt's pain was controlled with Tylenol prn & PO Toradol prn.     Course c/b hypoxia, cough, and fever which were first noted within 48 hrs of being admitted. RVP negative; blood cultures negative; CXR demonstrated new LLL opacity. Pt initiated on ceftriaxone for treatment of community-acquired pneumonia. Given pt's immunocompromised status, plan to complete 10 days of antibiotics with PO Levaquin as an outpatient. Attempted to wean O2, however pt still requiring O2 at time of discharge due to desaturations on ambulation. Pt will be discharged home with supplemental O2 while completing antibiotics.     Pt was noted incidentally on CT to have large, nearly occlusive distal thoracic aortic thrombus. Pt was evaluated by CT surgery who deferred intervention due to chronic appearance of the thrombus. Per discussion with vascular cardiology & hem/onc, initiated pt on systemic AC (Xarelto).    Additionally, pt was noted to have persistently stably elevated blood pressure with systolics in 160s-170s, asymptomatic. No prior history of hypertension. Initiated amlodipine 2.5 mg daily.    ACTION ITEMS    1. Disposition: Home  2. Follow up with PCP, oncology, IR  3. Medication changes:  	Start Xarelto  	Start amlodipine  	Start Levaquin to complete course of 10 days (last day May 26th) DISCHARGE SUMMARY    Ms. 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 CT redemonstrated osseous lytic lesions in T9, T10, T11, T12, and L2. Pt evaluated by oncology consult service, who recommended outpatient follow-up for port placement and initiation of palliative therapy. Pt's pain was controlled with Tylenol prn & PO Toradol prn.     Course c/b hypoxia, cough, and fever which were first noted within 48 hrs of being admitted. RVP negative; blood cultures negative; CXR demonstrated new LLL opacity. Pt initiated on ceftriaxone for treatment of community-acquired pneumonia. Given pt's immunocompromised status, plan to complete 10 days of antibiotics with PO Levaquin as an outpatient. Attempted to wean O2, however pt still requiring O2 at time of discharge due to desaturations on ambulation. Pt will be discharged home with supplemental O2 while completing antibiotics.     Pt was noted incidentally on CT to have large, nearly occlusive distal thoracic aortic thrombus. Pt was evaluated by CT surgery who deferred intervention due to chronic appearance of the thrombus. Per discussion with vascular cardiology & hem/onc, initiated pt on systemic AC (Xarelto).    Additionally, pt was noted to have persistently stably elevated blood pressure with systolics in 160s-170s, asymptomatic. No prior history of hypertension. Initiated amlodipine 2.5 mg daily.    ACTION ITEMS    1. Disposition: Home  2. Follow up with PCP, oncology, IR  3. Medication changes:  	Start Xarelto  	Start amlodipine  	Start Levaquin to complete course of 10 days (last day May 26th)  	Start lidocaine patch prn for back pain

## 2023-05-20 NOTE — CHART NOTE - NSCHARTNOTEFT_GEN_A_CORE
Ambulatory O2 saturation measured on 5/20. Pt observed to desaturate to 87% on room air while ambulating. SpO2 improved to 94% on 5L NC while ambulating.    Pt requires home supplemental oxygen while completing treatment for pneumonia.    Amor CÁRDENAS Resting, room air: SpO2 89%  Exercise, room air: SpO2 87%  Exercise, 5L NC: SpO2 94%    Pt requires continuous supplemental oxygen at home while completing treatment for pneumonia.    Amor CÁRDENAS

## 2023-05-20 NOTE — DISCHARGE NOTE PROVIDER - NSDCFUSCHEDAPPT_GEN_ALL_CORE_FT
Sienna Gill  [facility]  [facility]  Scheduled Appointment: 05/23/2023    [facility]  [facility]  Scheduled Appointment: 05/26/2023    [facility]  [facility]  Scheduled Appointment: 05/30/2023    [facility]  [facility]  Scheduled Appointment: 06/01/2023    Baltazar Carcamo  [facility]  [facility]  Scheduled Appointment: 06/02/2023    [facility]  [facility]  Scheduled Appointment: 06/02/2023     [facility]  [facility]  Scheduled Appointment: 05/26/2023    [facility]  Scheduled Appointment: 06/01/2023

## 2023-05-20 NOTE — PROGRESS NOTE ADULT - ATTENDING COMMENTS
72 year old in midst of oncological workup (found to have lytic lesions concerning for malignancy, MM ruled out, concern for bone metastases with unknown primary), admitted with worsening LBP, found to have focal large nearly occlusive thrombus in the distal descending aorta, course complicated by pneumonia, Now requiring O2 on 5L NC. CXR with left lobe opacity consistent with PNA, on CTX for empiric treatment. Will likely need a 7-10 day course of abx. Urine legionella negative, azithromycin d/cd. Patient desatting with ambulation, documented. Will need Home O2 setup.     Also with aortic thrombus, now on DOAC per vascular cards recs.    Malignancy w/u: to be completed as outpatient at [facility] with Dr. Flores.

## 2023-05-20 NOTE — DISCHARGE NOTE PROVIDER - NSDCCPCAREPLAN_GEN_ALL_CORE_FT
PRINCIPAL DISCHARGE DIAGNOSIS  Diagnosis: Metastatic cancer  Assessment and Plan of Treatment: You were admitted for evaluation of newly diagnosed metastatic cancer of unknown primary (meaning it is not known what organ the cancer originated from). You underwent a CT chest, abdomen, and pelvis which demonstrated multiple lytic lesions in your thoracic and lumbar spine. The inpatient oncology team evaluated you and recommended following up outpatient for port placement and initiation of treatment.  Please follow up with your oncologist within 1 week of leaving the hospital. Please follow up with interventional radiology for Mediport palcement. It is very important that you attend these follow-up appointments and take all medications as prescribed on discharge.  If you develop new or worsening symptoms, such as chest pain, dizziness, fainting, worsening shortness of breath, abdominal pain, severe back pain, nausea or vomiting, fever or chills, please call your doctor or go directly to the nearest emergency room.      SECONDARY DISCHARGE DIAGNOSES  Diagnosis: Pneumonia  Assessment and Plan of Treatment: Shortly after being admitted to the hospital, you developed signs and symptoms concerning for a respiratory infection, including fever, cough, and hypoxia. You tested negative for a panel of common respiratory viruses, including COVID. Your chest X-ray showed a left lower lobe opacity consistent with pneumonia. You were started on an IV antibiotic (ceftriaxone), and will need to complete a total of 10 days of antibiotics. On discharge, you will be prescribed an oral antibiotic (Levaquin) to complete this course.   Due to the pneumonia, you required supplemental oxygen to support your oxygen levels. You will be discharged with supplemental oxygen that you can wear while finishing your antibiotics and recovering from pneumonia.    Diagnosis: Aortic thrombus  Assessment and Plan of Treatment: The CT scan when you first came to the emergency room found that you have a thrombus, or clot, in your aorta. The cause of this clot is unclear, however cancer does predispose you to clot formation. Cardiac surgery did not recommend surgical intervention. Instead, you were started on an anticoagulant (blood thinner). You will be prescribed an oral blood thinner (Xarelto) on dsicharge.    Diagnosis: High blood pressure  Assessment and Plan of Treatment: You were noted to have elevated blood pressure while admitted to the hospital. Your systolic blood pressure (the top number) stayed in the 160s to 170s. You were started on a blood pressure medication called amlodipine.   Please follow up with your PCP for further management of your blood pressure.

## 2023-05-21 LAB
A1C WITH ESTIMATED AVERAGE GLUCOSE RESULT: 5.7 % — HIGH (ref 4–5.6)
ALBUMIN SERPL ELPH-MCNC: 2.6 G/DL — LOW (ref 3.3–5)
ALP SERPL-CCNC: 214 U/L — HIGH (ref 40–120)
ALT FLD-CCNC: 35 U/L — HIGH (ref 4–33)
ANION GAP SERPL CALC-SCNC: 11 MMOL/L — SIGNIFICANT CHANGE UP (ref 7–14)
AST SERPL-CCNC: 33 U/L — HIGH (ref 4–32)
BILIRUB SERPL-MCNC: 0.3 MG/DL — SIGNIFICANT CHANGE UP (ref 0.2–1.2)
BUN SERPL-MCNC: 12 MG/DL — SIGNIFICANT CHANGE UP (ref 7–23)
CALCIUM SERPL-MCNC: 8.5 MG/DL — SIGNIFICANT CHANGE UP (ref 8.4–10.5)
CHLORIDE SERPL-SCNC: 101 MMOL/L — SIGNIFICANT CHANGE UP (ref 98–107)
CHOLEST SERPL-MCNC: 94 MG/DL — SIGNIFICANT CHANGE UP
CO2 SERPL-SCNC: 26 MMOL/L — SIGNIFICANT CHANGE UP (ref 22–31)
CREAT SERPL-MCNC: 0.49 MG/DL — LOW (ref 0.5–1.3)
EGFR: 100 ML/MIN/1.73M2 — SIGNIFICANT CHANGE UP
ESTIMATED AVERAGE GLUCOSE: 117 — SIGNIFICANT CHANGE UP
GLUCOSE SERPL-MCNC: 105 MG/DL — HIGH (ref 70–99)
HCT VFR BLD CALC: 30.8 % — LOW (ref 34.5–45)
HDLC SERPL-MCNC: 28 MG/DL — LOW
HGB BLD-MCNC: 9.2 G/DL — LOW (ref 11.5–15.5)
LIPID PNL WITH DIRECT LDL SERPL: 54 MG/DL — SIGNIFICANT CHANGE UP
MAGNESIUM SERPL-MCNC: 1.9 MG/DL — SIGNIFICANT CHANGE UP (ref 1.6–2.6)
MCHC RBC-ENTMCNC: 26.4 PG — LOW (ref 27–34)
MCHC RBC-ENTMCNC: 29.9 GM/DL — LOW (ref 32–36)
MCV RBC AUTO: 88.3 FL — SIGNIFICANT CHANGE UP (ref 80–100)
NON HDL CHOLESTEROL: 66 MG/DL — SIGNIFICANT CHANGE UP
NRBC # BLD: 0 /100 WBCS — SIGNIFICANT CHANGE UP (ref 0–0)
NRBC # FLD: 0 K/UL — SIGNIFICANT CHANGE UP (ref 0–0)
PHOSPHATE SERPL-MCNC: 2.9 MG/DL — SIGNIFICANT CHANGE UP (ref 2.5–4.5)
PLATELET # BLD AUTO: 346 K/UL — SIGNIFICANT CHANGE UP (ref 150–400)
POTASSIUM SERPL-MCNC: 3.9 MMOL/L — SIGNIFICANT CHANGE UP (ref 3.5–5.3)
POTASSIUM SERPL-SCNC: 3.9 MMOL/L — SIGNIFICANT CHANGE UP (ref 3.5–5.3)
PROT SERPL-MCNC: 6 G/DL — SIGNIFICANT CHANGE UP (ref 6–8.3)
RBC # BLD: 3.49 M/UL — LOW (ref 3.8–5.2)
RBC # FLD: 16.8 % — HIGH (ref 10.3–14.5)
SODIUM SERPL-SCNC: 138 MMOL/L — SIGNIFICANT CHANGE UP (ref 135–145)
TRIGL SERPL-MCNC: 62 MG/DL — SIGNIFICANT CHANGE UP
WBC # BLD: 6.84 K/UL — SIGNIFICANT CHANGE UP (ref 3.8–10.5)
WBC # FLD AUTO: 6.84 K/UL — SIGNIFICANT CHANGE UP (ref 3.8–10.5)

## 2023-05-21 PROCEDURE — 99233 SBSQ HOSP IP/OBS HIGH 50: CPT

## 2023-05-21 RX ORDER — SIMETHICONE 80 MG/1
80 TABLET, CHEWABLE ORAL ONCE
Refills: 0 | Status: COMPLETED | OUTPATIENT
Start: 2023-05-21 | End: 2023-05-21

## 2023-05-21 RX ORDER — ACETAMINOPHEN 500 MG
750 TABLET ORAL ONCE
Refills: 0 | Status: COMPLETED | OUTPATIENT
Start: 2023-05-21 | End: 2023-05-21

## 2023-05-21 RX ORDER — SODIUM CHLORIDE 0.65 %
1 AEROSOL, SPRAY (ML) NASAL
Refills: 0 | Status: DISCONTINUED | OUTPATIENT
Start: 2023-05-21 | End: 2023-05-21

## 2023-05-21 RX ORDER — ACETAMINOPHEN 500 MG
750 TABLET ORAL ONCE
Refills: 0 | Status: DISCONTINUED | OUTPATIENT
Start: 2023-05-21 | End: 2023-05-21

## 2023-05-21 RX ADMIN — SIMETHICONE 80 MILLIGRAM(S): 80 TABLET, CHEWABLE ORAL at 18:22

## 2023-05-21 RX ADMIN — Medication 3 MILLIGRAM(S): at 21:46

## 2023-05-21 RX ADMIN — Medication 750 MILLIGRAM(S): at 22:16

## 2023-05-21 RX ADMIN — RIVAROXABAN 15 MILLIGRAM(S): KIT at 09:36

## 2023-05-21 RX ADMIN — Medication 75 MICROGRAM(S): at 06:31

## 2023-05-21 RX ADMIN — Medication 10 MILLIGRAM(S): at 11:02

## 2023-05-21 RX ADMIN — Medication 100 MILLIGRAM(S): at 06:32

## 2023-05-21 RX ADMIN — Medication 650 MILLIGRAM(S): at 04:30

## 2023-05-21 RX ADMIN — Medication 1000 UNIT(S): at 13:13

## 2023-05-21 RX ADMIN — AMLODIPINE BESYLATE 2.5 MILLIGRAM(S): 2.5 TABLET ORAL at 06:32

## 2023-05-21 RX ADMIN — SENNA PLUS 1 TABLET(S): 8.6 TABLET ORAL at 21:46

## 2023-05-21 RX ADMIN — Medication 10 MILLIGRAM(S): at 18:11

## 2023-05-21 RX ADMIN — Medication 10 MILLIGRAM(S): at 12:02

## 2023-05-21 RX ADMIN — CEFTRIAXONE 100 MILLIGRAM(S): 500 INJECTION, POWDER, FOR SOLUTION INTRAMUSCULAR; INTRAVENOUS at 15:28

## 2023-05-21 RX ADMIN — Medication 10 MILLIGRAM(S): at 19:11

## 2023-05-21 RX ADMIN — Medication 100 MILLIGRAM(S): at 21:46

## 2023-05-21 RX ADMIN — PANTOPRAZOLE SODIUM 40 MILLIGRAM(S): 20 TABLET, DELAYED RELEASE ORAL at 06:32

## 2023-05-21 RX ADMIN — Medication 300 MILLIGRAM(S): at 21:46

## 2023-05-21 RX ADMIN — Medication 650 MILLIGRAM(S): at 05:30

## 2023-05-21 RX ADMIN — Medication 100 MILLIGRAM(S): at 15:28

## 2023-05-21 RX ADMIN — RIVAROXABAN 15 MILLIGRAM(S): KIT at 18:11

## 2023-05-21 NOTE — PROGRESS NOTE ADULT - SUBJECTIVE AND OBJECTIVE BOX
Dante Cloud MD  Internal Medicine, PGY-2    KRUNAL RUEDA  72y  MRN: [id number]    Patient is a 72y old  Female who presents with a chief complaint of Malignancy workup (20 May 2023 17:40)      Subjective/Interval history/overnight events:      MEDICATIONS  (STANDING):  amLODIPine   Tablet 2.5 milliGRAM(s) Oral daily  benzonatate 100 milliGRAM(s) Oral three times a day  cefTRIAXone   IVPB      cefTRIAXone   IVPB 1000 milliGRAM(s) IV Intermittent every 24 hours  cholecalciferol 1000 Unit(s) Oral daily  levothyroxine 75 MICROGram(s) Oral daily  melatonin 3 milliGRAM(s) Oral at bedtime  pantoprazole    Tablet 40 milliGRAM(s) Oral before breakfast  polyethylene glycol 3350 17 Gram(s) Oral at bedtime  rivaroxaban 15 milliGRAM(s) Oral two times a day with meals  senna 1 Tablet(s) Oral at bedtime    MEDICATIONS  (PRN):  acetaminophen     Tablet .. 650 milliGRAM(s) Oral every 6 hours PRN Temp greater or equal to 38C (100.4F), Mild Pain (1 - 3)  guaiFENesin Oral Liquid (Sugar-Free) 100 milliGRAM(s) Oral every 6 hours PRN Cough  ketorolac 10 milliGRAM(s) Oral every 6 hours PRN Severe Pain (7 - 10)  sodium chloride 0.65% Nasal 1 Spray(s) Both Nostrils every 8 hours PRN Nasal Congestion        Objective:    Vitals: Vital Signs Last 24 Hrs  T(C): 37.1 (05-20-23 @ 21:30), Max: 37.2 (05-20-23 @ 15:21)  T(F): 98.8 (05-20-23 @ 21:30), Max: 99 (05-20-23 @ 15:21)  HR: 76 (05-20-23 @ 21:30) (76 - 82)  BP: 146/64 (05-20-23 @ 21:30) (142/62 - 146/64)  BP(mean): --  RR: 18 (05-20-23 @ 21:30) (18 - 19)  SpO2: 96% (05-20-23 @ 21:30) (87% - 98%)            I&O's Summary    19 May 2023 07:01  -  20 May 2023 07:00  --------------------------------------------------------  IN: 850 mL / OUT: 0 mL / NET: 850 mL    20 May 2023 07:01  -  21 May 2023 06:27  --------------------------------------------------------  IN: 530 mL / OUT: 0 mL / NET: 530 mL        PHYSICAL EXAM:  GENERAL: NAD  HEENT: PERRL, no scleral icterus, no head and neck lad   CHEST/LUNG: CTAB, no wheezing, crackles, or ronchi   HEART: RRR, normal S1, S2, no murmurs, gallops, or rubs appreciated   ABDOMEN: soft, nondistended, non-tender, normoactive, no HSM, no rebound, no guarding, no rigidity  SKIN: No rashes or lesions  NERVOUS SYSTEM: Alert & Oriented X3  EXT: no peripheral edema  PSYCH: calm and cooperative     LABS:    05-20    137  |  98  |  11  ----------------------------<  109<H>  3.4<L>   |  28  |  0.48<L>  05-19    138  |  99  |  10  ----------------------------<  100<H>  3.7   |  26  |  0.47<L>  05-18    135  |  98  |  8   ----------------------------<  117<H>  4.3   |  25  |  0.49<L>    Ca    8.9      20 May 2023 06:57  Ca    9.0      19 May 2023 05:20  Ca    9.4      18 May 2023 07:18  Phos  3.0     05-20  Mg     1.60     05-20    TPro  6.2  /  Alb  2.8<L>  /  TBili  0.5  /  DBili  x   /  AST  17  /  ALT  22  /  AlkPhos  243<H>  05-20  TPro  6.4  /  Alb  2.7<L>  /  TBili  0.5  /  DBili  x   /  AST  17  /  ALT  22  /  AlkPhos  269<H>  05-19  TPro  7.2  /  Alb  3.1<L>  /  TBili  0.8  /  DBili  x   /  AST  19  /  ALT  27  /  AlkPhos  336<H>  05-18                                            9.9    6.89  )-----------( 366      ( 20 May 2023 06:57 )             32.3                         9.0    9.00  )-----------( 361      ( 19 May 2023 05:20 )             29.4                         10.0   10.21 )-----------( 359      ( 18 May 2023 07:18 )             33.0     CAPILLARY BLOOD GLUCOSE              RADIOLOGY & ADDITIONAL TESTS:            Imaging Personally Reviewed:  [ ] YES  [ ] NO    Consultants involved in case:   Consultant(s) Notes Reviewed:  [ ] YES  [ ] NO:   Care Discussed with Consultants/Other Providers [ ] YES  [ ] NO         Dante Cloud MD  Internal Medicine, PGY-2    KRUNAL RUEDA  72y  MRN: [id number]    Patient is a 72y old  Female who presents with a chief complaint of Malignancy workup (20 May 2023 17:40)      Subjective/Interval history/overnight events:  No acute events overnight. No concerns at this time.       MEDICATIONS  (STANDING):  amLODIPine   Tablet 2.5 milliGRAM(s) Oral daily  benzonatate 100 milliGRAM(s) Oral three times a day  cefTRIAXone   IVPB      cefTRIAXone   IVPB 1000 milliGRAM(s) IV Intermittent every 24 hours  cholecalciferol 1000 Unit(s) Oral daily  levothyroxine 75 MICROGram(s) Oral daily  melatonin 3 milliGRAM(s) Oral at bedtime  pantoprazole    Tablet 40 milliGRAM(s) Oral before breakfast  polyethylene glycol 3350 17 Gram(s) Oral at bedtime  rivaroxaban 15 milliGRAM(s) Oral two times a day with meals  senna 1 Tablet(s) Oral at bedtime    MEDICATIONS  (PRN):  acetaminophen     Tablet .. 650 milliGRAM(s) Oral every 6 hours PRN Temp greater or equal to 38C (100.4F), Mild Pain (1 - 3)  guaiFENesin Oral Liquid (Sugar-Free) 100 milliGRAM(s) Oral every 6 hours PRN Cough  ketorolac 10 milliGRAM(s) Oral every 6 hours PRN Severe Pain (7 - 10)  sodium chloride 0.65% Nasal 1 Spray(s) Both Nostrils every 8 hours PRN Nasal Congestion        Objective:    Vitals: Vital Signs Last 24 Hrs  T(C): 37.1 (05-20-23 @ 21:30), Max: 37.2 (05-20-23 @ 15:21)  T(F): 98.8 (05-20-23 @ 21:30), Max: 99 (05-20-23 @ 15:21)  HR: 76 (05-20-23 @ 21:30) (76 - 82)  BP: 146/64 (05-20-23 @ 21:30) (142/62 - 146/64)  BP(mean): --  RR: 18 (05-20-23 @ 21:30) (18 - 19)  SpO2: 96% (05-20-23 @ 21:30) (87% - 98%)            I&O's Summary    19 May 2023 07:01  -  20 May 2023 07:00  --------------------------------------------------------  IN: 850 mL / OUT: 0 mL / NET: 850 mL    20 May 2023 07:01  -  21 May 2023 06:27  --------------------------------------------------------  IN: 530 mL / OUT: 0 mL / NET: 530 mL        PHYSICAL EXAM:  GENERAL: NAD  HEENT: PERRL, no scleral icterus, no head and neck lad   CHEST/LUNG: CTAB, no wheezing, crackles, or ronchi   HEART: RRR, normal S1, S2, no murmurs, gallops, or rubs appreciated   ABDOMEN: soft, nondistended, non-tender, normoactive, no HSM, no rebound, no guarding, no rigidity  SKIN: No rashes or lesions  NERVOUS SYSTEM: Alert & Oriented X3  EXT: no peripheral edema  PSYCH: calm and cooperative     LABS:    05-20    137  |  98  |  11  ----------------------------<  109<H>  3.4<L>   |  28  |  0.48<L>  05-19    138  |  99  |  10  ----------------------------<  100<H>  3.7   |  26  |  0.47<L>  05-18    135  |  98  |  8   ----------------------------<  117<H>  4.3   |  25  |  0.49<L>    Ca    8.9      20 May 2023 06:57  Ca    9.0      19 May 2023 05:20  Ca    9.4      18 May 2023 07:18  Phos  3.0     05-20  Mg     1.60     05-20    TPro  6.2  /  Alb  2.8<L>  /  TBili  0.5  /  DBili  x   /  AST  17  /  ALT  22  /  AlkPhos  243<H>  05-20  TPro  6.4  /  Alb  2.7<L>  /  TBili  0.5  /  DBili  x   /  AST  17  /  ALT  22  /  AlkPhos  269<H>  05-19  TPro  7.2  /  Alb  3.1<L>  /  TBili  0.8  /  DBili  x   /  AST  19  /  ALT  27  /  AlkPhos  336<H>  05-18                                            9.9    6.89  )-----------( 366      ( 20 May 2023 06:57 )             32.3                         9.0    9.00  )-----------( 361      ( 19 May 2023 05:20 )             29.4                         10.0   10.21 )-----------( 359      ( 18 May 2023 07:18 )             33.0     CAPILLARY BLOOD GLUCOSE              RADIOLOGY & ADDITIONAL TESTS:            Imaging Personally Reviewed:  [ ] YES  [ ] NO    Consultants involved in case:   Consultant(s) Notes Reviewed:  [ ] YES  [ ] NO:   Care Discussed with Consultants/Other Providers [ ] YES  [ ] NO

## 2023-05-21 NOTE — PROGRESS NOTE ADULT - ATTENDING COMMENTS
72 year old in midst of oncological workup (found to have lytic lesions concerning for malignancy, MM ruled out, concern for bone metastases with unknown primary), admitted with worsening LBP, found to have focal large nearly occlusive thrombus in the distal descending aorta, course complicated by pneumonia, Now requiring O2 on 5L NC. CXR with left lobe opacity consistent with PNA, on CTX for empiric treatment. Will plan for 10 day course of abx, can likely be discharged on levaquin 750mg qd upon discharge. Urine legionella negative, azithromycin d/cd. Patient still desatting with ambulation, documented. Will need Home O2 setup, CM/SW aware     Also with aortic thrombus, now on DOAC per vascular cards recs.    #Hypoxia: plan for home O2 setup, desatting with ambulation to <88%.    Malignancy w/u: to be completed as outpatient at [facility] with Dr. Flores.

## 2023-05-21 NOTE — PROGRESS NOTE ADULT - PROBLEM SELECTOR PLAN 6
Noted systolic blood pressures persistently 160-170 during admission. No prior hx HTN, not on antihypertensives  - may be in setting of pain, acute stress  - asymptomatic  - will start 2.5 amlodipine today due to persistent elevation  - PCP f/u

## 2023-05-21 NOTE — PROGRESS NOTE ADULT - PROBLEM SELECTOR PLAN 1
Noted desat to 80% on RA on 5/15, placed on 6L NC with improvement. Unable to wean to room air due to recurrent desaturation. Also reporting worsened cough (pt sometimes has cough in setting of reflux at home)  - developed fever on 5/17 to Tm 101.1  - given new fever, cough, hypoxia, concern for CAP. New LLL opacity observed on 5/17 CXR (not seen on 5/15 CT)  - RVP x2 negative  - started ceftriaxone (5/17-) and azithromycin (5/17, d/scott after urine Legionella negative)  - BCx ngtd prelim  - send sputum cx if able  - incentive spirometry  - guaifenesin, tessalon perles  - wean O2 as tolerated  - daily ambulatory sats

## 2023-05-22 LAB
ALBUMIN SERPL ELPH-MCNC: 3 G/DL — LOW (ref 3.3–5)
ALP SERPL-CCNC: 228 U/L — HIGH (ref 40–120)
ALT FLD-CCNC: 40 U/L — HIGH (ref 4–33)
ANION GAP SERPL CALC-SCNC: 10 MMOL/L — SIGNIFICANT CHANGE UP (ref 7–14)
AST SERPL-CCNC: 23 U/L — SIGNIFICANT CHANGE UP (ref 4–32)
BILIRUB SERPL-MCNC: 0.4 MG/DL — SIGNIFICANT CHANGE UP (ref 0.2–1.2)
BUN SERPL-MCNC: 7 MG/DL — SIGNIFICANT CHANGE UP (ref 7–23)
CALCIUM SERPL-MCNC: 9.2 MG/DL — SIGNIFICANT CHANGE UP (ref 8.4–10.5)
CHLORIDE SERPL-SCNC: 96 MMOL/L — LOW (ref 98–107)
CO2 SERPL-SCNC: 28 MMOL/L — SIGNIFICANT CHANGE UP (ref 22–31)
CREAT SERPL-MCNC: 0.39 MG/DL — LOW (ref 0.5–1.3)
CULTURE RESULTS: SIGNIFICANT CHANGE UP
CULTURE RESULTS: SIGNIFICANT CHANGE UP
EGFR: 106 ML/MIN/1.73M2 — SIGNIFICANT CHANGE UP
GLUCOSE SERPL-MCNC: 110 MG/DL — HIGH (ref 70–99)
HCT VFR BLD CALC: 34.6 % — SIGNIFICANT CHANGE UP (ref 34.5–45)
HGB BLD-MCNC: 10.6 G/DL — LOW (ref 11.5–15.5)
MAGNESIUM SERPL-MCNC: 2 MG/DL — SIGNIFICANT CHANGE UP (ref 1.6–2.6)
MCHC RBC-ENTMCNC: 26.2 PG — LOW (ref 27–34)
MCHC RBC-ENTMCNC: 30.6 GM/DL — LOW (ref 32–36)
MCV RBC AUTO: 85.4 FL — SIGNIFICANT CHANGE UP (ref 80–100)
NRBC # BLD: 0 /100 WBCS — SIGNIFICANT CHANGE UP (ref 0–0)
NRBC # FLD: 0 K/UL — SIGNIFICANT CHANGE UP (ref 0–0)
PHOSPHATE SERPL-MCNC: 3.1 MG/DL — SIGNIFICANT CHANGE UP (ref 2.5–4.5)
PLATELET # BLD AUTO: 440 K/UL — HIGH (ref 150–400)
POTASSIUM SERPL-MCNC: 3.8 MMOL/L — SIGNIFICANT CHANGE UP (ref 3.5–5.3)
POTASSIUM SERPL-SCNC: 3.8 MMOL/L — SIGNIFICANT CHANGE UP (ref 3.5–5.3)
PROT SERPL-MCNC: 6.9 G/DL — SIGNIFICANT CHANGE UP (ref 6–8.3)
RBC # BLD: 4.05 M/UL — SIGNIFICANT CHANGE UP (ref 3.8–5.2)
RBC # FLD: 16.6 % — HIGH (ref 10.3–14.5)
SARS-COV-2 RNA SPEC QL NAA+PROBE: SIGNIFICANT CHANGE UP
SODIUM SERPL-SCNC: 134 MMOL/L — LOW (ref 135–145)
SPECIMEN SOURCE: SIGNIFICANT CHANGE UP
SPECIMEN SOURCE: SIGNIFICANT CHANGE UP
WBC # BLD: 8.46 K/UL — SIGNIFICANT CHANGE UP (ref 3.8–10.5)
WBC # FLD AUTO: 8.46 K/UL — SIGNIFICANT CHANGE UP (ref 3.8–10.5)

## 2023-05-22 PROCEDURE — 99232 SBSQ HOSP IP/OBS MODERATE 35: CPT | Mod: GC

## 2023-05-22 RX ORDER — LIDOCAINE 4 G/100G
1 CREAM TOPICAL ONCE
Refills: 0 | Status: COMPLETED | OUTPATIENT
Start: 2023-05-22 | End: 2023-05-22

## 2023-05-22 RX ADMIN — Medication 10 MILLIGRAM(S): at 11:49

## 2023-05-22 RX ADMIN — LIDOCAINE 1 PATCH: 4 CREAM TOPICAL at 19:48

## 2023-05-22 RX ADMIN — Medication 100 MILLIGRAM(S): at 13:59

## 2023-05-22 RX ADMIN — SENNA PLUS 1 TABLET(S): 8.6 TABLET ORAL at 22:22

## 2023-05-22 RX ADMIN — Medication 10 MILLIGRAM(S): at 05:05

## 2023-05-22 RX ADMIN — Medication 10 MILLIGRAM(S): at 10:39

## 2023-05-22 RX ADMIN — CEFTRIAXONE 100 MILLIGRAM(S): 500 INJECTION, POWDER, FOR SOLUTION INTRAMUSCULAR; INTRAVENOUS at 15:11

## 2023-05-22 RX ADMIN — Medication 10 MILLIGRAM(S): at 18:32

## 2023-05-22 RX ADMIN — RIVAROXABAN 15 MILLIGRAM(S): KIT at 18:04

## 2023-05-22 RX ADMIN — AMLODIPINE BESYLATE 2.5 MILLIGRAM(S): 2.5 TABLET ORAL at 06:33

## 2023-05-22 RX ADMIN — Medication 100 MILLIGRAM(S): at 06:33

## 2023-05-22 RX ADMIN — Medication 1000 UNIT(S): at 11:53

## 2023-05-22 RX ADMIN — Medication 75 MICROGRAM(S): at 05:27

## 2023-05-22 RX ADMIN — Medication 10 MILLIGRAM(S): at 18:04

## 2023-05-22 RX ADMIN — RIVAROXABAN 15 MILLIGRAM(S): KIT at 09:46

## 2023-05-22 RX ADMIN — Medication 100 MILLIGRAM(S): at 22:22

## 2023-05-22 RX ADMIN — Medication 3 MILLIGRAM(S): at 22:22

## 2023-05-22 RX ADMIN — PANTOPRAZOLE SODIUM 40 MILLIGRAM(S): 20 TABLET, DELAYED RELEASE ORAL at 06:33

## 2023-05-22 RX ADMIN — Medication 10 MILLIGRAM(S): at 04:05

## 2023-05-22 NOTE — PROGRESS NOTE ADULT - SUBJECTIVE AND OBJECTIVE BOX
DATE OF SERVICE: 05-22-23 @ 07:29    Patient is a 72y old  Female who presents with a chief complaint of Malignancy workup (21 May 2023 06:27)      SUBJECTIVE / OVERNIGHT EVENTS:  No acute events overnight  Afebrile, hemodynamically stable. SpO2 96-98% on 5L NC  ***    MEDICATIONS  (STANDING):  amLODIPine   Tablet 2.5 milliGRAM(s) Oral daily  benzonatate 100 milliGRAM(s) Oral three times a day  cefTRIAXone   IVPB 1000 milliGRAM(s) IV Intermittent every 24 hours  cefTRIAXone   IVPB      cholecalciferol 1000 Unit(s) Oral daily  levothyroxine 75 MICROGram(s) Oral daily  melatonin 3 milliGRAM(s) Oral at bedtime  pantoprazole    Tablet 40 milliGRAM(s) Oral before breakfast  polyethylene glycol 3350 17 Gram(s) Oral at bedtime  rivaroxaban 15 milliGRAM(s) Oral two times a day with meals  senna 1 Tablet(s) Oral at bedtime    MEDICATIONS  (PRN):  acetaminophen     Tablet .. 650 milliGRAM(s) Oral every 6 hours PRN Temp greater or equal to 38C (100.4F), Mild Pain (1 - 3)  guaiFENesin Oral Liquid (Sugar-Free) 100 milliGRAM(s) Oral every 6 hours PRN Cough  ketorolac 10 milliGRAM(s) Oral every 6 hours PRN Severe Pain (7 - 10)  sodium chloride 0.65% Nasal 1 Spray(s) Both Nostrils every 8 hours PRN Nasal Congestion      Vital Signs Last 24 Hrs  T(C): 36.7 (22 May 2023 05:15), Max: 37.2 (21 May 2023 14:00)  T(F): 98.1 (22 May 2023 05:15), Max: 99 (21 May 2023 14:00)  HR: 60 (22 May 2023 05:15) (60 - 78)  BP: 154/85 (22 May 2023 05:15) (139/71 - 154/85)  BP(mean): --  RR: 16 (22 May 2023 05:15) (16 - 18)  SpO2: 96% (22 May 2023 05:15) (93% - 98%)    Parameters below as of 22 May 2023 05:15  Patient On (Oxygen Delivery Method): nasal cannula  O2 Flow (L/min): 5        I&O's Summary    21 May 2023 07:01  -  22 May 2023 07:00  --------------------------------------------------------  IN: 300 mL / OUT: 0 mL / NET: 300 mL        PHYSICAL EXAM:  GENERAL: No apparent distress  HEAD:  Atraumatic, normocephalic  EYES: PERRL, sclera clear  NECK: Supple, no JVD  CHEST/LUNG: Bibasilar crackles, good air entry, non-labored respirations on 5L NC  HEART: Regular rate and rhythm; No murmurs, rubs, or gallops  ABDOMEN: Soft, nontender, nondistended; Bowel sounds present  EXTREMITIES:  No peripheral edema  PSYCH: AAOx3  NEUROLOGY: No focal deficits  SKIN: No rashes or lesions      LABS:             ***      RADIOLOGY & ADDITIONAL TESTS:  Reviewed     DATE OF SERVICE: 05-22-23 @ 07:29    Patient is a 72y old  Female who presents with a chief complaint of Malignancy workup (21 May 2023 06:27)      SUBJECTIVE / OVERNIGHT EVENTS:  No acute events overnight  Afebrile, hemodynamically stable. SpO2 96-98% on 5L NC  No new complaints. Reports cough has resolved. Reports improvement in dyspnea on ambulation, states she was able to ambulate to bathroom without becoming short of breath.  Ambulatory sats repeated this morning & pt noted to desat from 99% on 4L at rest to 85% on 4L on ambulation    MEDICATIONS  (STANDING):  amLODIPine   Tablet 2.5 milliGRAM(s) Oral daily  benzonatate 100 milliGRAM(s) Oral three times a day  cefTRIAXone   IVPB 1000 milliGRAM(s) IV Intermittent every 24 hours  cefTRIAXone   IVPB      cholecalciferol 1000 Unit(s) Oral daily  levothyroxine 75 MICROGram(s) Oral daily  melatonin 3 milliGRAM(s) Oral at bedtime  pantoprazole    Tablet 40 milliGRAM(s) Oral before breakfast  polyethylene glycol 3350 17 Gram(s) Oral at bedtime  rivaroxaban 15 milliGRAM(s) Oral two times a day with meals  senna 1 Tablet(s) Oral at bedtime    MEDICATIONS  (PRN):  acetaminophen     Tablet .. 650 milliGRAM(s) Oral every 6 hours PRN Temp greater or equal to 38C (100.4F), Mild Pain (1 - 3)  guaiFENesin Oral Liquid (Sugar-Free) 100 milliGRAM(s) Oral every 6 hours PRN Cough  ketorolac 10 milliGRAM(s) Oral every 6 hours PRN Severe Pain (7 - 10)  sodium chloride 0.65% Nasal 1 Spray(s) Both Nostrils every 8 hours PRN Nasal Congestion      Vital Signs Last 24 Hrs  T(C): 36.7 (22 May 2023 05:15), Max: 37.2 (21 May 2023 14:00)  T(F): 98.1 (22 May 2023 05:15), Max: 99 (21 May 2023 14:00)  HR: 60 (22 May 2023 05:15) (60 - 78)  BP: 154/85 (22 May 2023 05:15) (139/71 - 154/85)  BP(mean): --  RR: 16 (22 May 2023 05:15) (16 - 18)  SpO2: 96% (22 May 2023 05:15) (93% - 98%)    Parameters below as of 22 May 2023 05:15  Patient On (Oxygen Delivery Method): nasal cannula  O2 Flow (L/min): 5        I&O's Summary    21 May 2023 07:01  -  22 May 2023 07:00  --------------------------------------------------------  IN: 300 mL / OUT: 0 mL / NET: 300 mL        PHYSICAL EXAM:  GENERAL: No apparent distress  HEAD:  Atraumatic, normocephalic  EYES: PERRL, sclera clear  NECK: Supple, no JVD  CHEST/LUNG: Clear to auscultation, non-labored respirations on 4L NC  HEART: Regular rate and rhythm; No murmurs, rubs, or gallops  ABDOMEN: Soft, nontender, nondistended; Bowel sounds present  EXTREMITIES:  No peripheral edema  PSYCH: AAOx3  NEUROLOGY: No focal deficits  SKIN: No rashes or lesions      LABS:                                   10.6   8.46  )-----------( 440      ( 22 May 2023 07:21 )             34.6     05-22    134<L>  |  96<L>  |  7   ----------------------------<  110<H>  3.8   |  28  |  0.39<L>    Ca    9.2      22 May 2023 07:21  Phos  3.1     05-22  Mg     2.00     05-22    TPro  6.9  /  Alb  3.0<L>  /  TBili  0.4  /  DBili  x   /  AST  23  /  ALT  40<H>  /  AlkPhos  228<H>  05-22      RADIOLOGY & ADDITIONAL TESTS:  Reviewed     DATE OF SERVICE: 05-22-23 @ 07:29    Patient is a 72y old  Female who presents with a chief concern of Malignancy workup (21 May 2023 06:27)      SUBJECTIVE / OVERNIGHT EVENTS:  No acute events overnight  Afebrile, hemodynamically stable. SpO2 96-98% on 5L NC  No new concerns. Reports cough has resolved. Reports improvement in dyspnea on ambulation, states she was able to ambulate to bathroom without becoming short of breath.  Ambulatory sats repeated this morning & pt noted to desat from 99% on 4L at rest to 85% on 4L on ambulation    MEDICATIONS  (STANDING):  amLODIPine   Tablet 2.5 milliGRAM(s) Oral daily  benzonatate 100 milliGRAM(s) Oral three times a day  cefTRIAXone   IVPB 1000 milliGRAM(s) IV Intermittent every 24 hours  cefTRIAXone   IVPB      cholecalciferol 1000 Unit(s) Oral daily  levothyroxine 75 MICROGram(s) Oral daily  melatonin 3 milliGRAM(s) Oral at bedtime  pantoprazole    Tablet 40 milliGRAM(s) Oral before breakfast  polyethylene glycol 3350 17 Gram(s) Oral at bedtime  rivaroxaban 15 milliGRAM(s) Oral two times a day with meals  senna 1 Tablet(s) Oral at bedtime    MEDICATIONS  (PRN):  acetaminophen     Tablet .. 650 milliGRAM(s) Oral every 6 hours PRN Temp greater or equal to 38C (100.4F), Mild Pain (1 - 3)  guaiFENesin Oral Liquid (Sugar-Free) 100 milliGRAM(s) Oral every 6 hours PRN Cough  ketorolac 10 milliGRAM(s) Oral every 6 hours PRN Severe Pain (7 - 10)  sodium chloride 0.65% Nasal 1 Spray(s) Both Nostrils every 8 hours PRN Nasal Congestion      Vital Signs Last 24 Hrs  T(C): 36.7 (22 May 2023 05:15), Max: 37.2 (21 May 2023 14:00)  T(F): 98.1 (22 May 2023 05:15), Max: 99 (21 May 2023 14:00)  HR: 60 (22 May 2023 05:15) (60 - 78)  BP: 154/85 (22 May 2023 05:15) (139/71 - 154/85)  BP(mean): --  RR: 16 (22 May 2023 05:15) (16 - 18)  SpO2: 96% (22 May 2023 05:15) (93% - 98%)    Parameters below as of 22 May 2023 05:15  Patient On (Oxygen Delivery Method): nasal cannula  O2 Flow (L/min): 5        I&O's Summary    21 May 2023 07:01  -  22 May 2023 07:00  --------------------------------------------------------  IN: 300 mL / OUT: 0 mL / NET: 300 mL        PHYSICAL EXAM:  GENERAL: No apparent distress  HEAD:  Atraumatic, normocephalic  EYES: PERRL, sclera clear  NECK: Supple, no JVD  CHEST/LUNG: Clear to auscultation, non-labored respirations on 4L NC  HEART: Regular rate and rhythm; No murmurs, rubs, or gallops  ABDOMEN: Soft, nontender, nondistended; Bowel sounds present  EXTREMITIES:  No peripheral edema  PSYCH: AAOx3  NEUROLOGY: No focal deficits  SKIN: No rashes or lesions      LABS:                                   10.6   8.46  )-----------( 440      ( 22 May 2023 07:21 )             34.6     05-22    134<L>  |  96<L>  |  7   ----------------------------<  110<H>  3.8   |  28  |  0.39<L>    Ca    9.2      22 May 2023 07:21  Phos  3.1     05-22  Mg     2.00     05-22    TPro  6.9  /  Alb  3.0<L>  /  TBili  0.4  /  DBili  x   /  AST  23  /  ALT  40<H>  /  AlkPhos  228<H>  05-22      RADIOLOGY & ADDITIONAL TESTS:  Reviewed

## 2023-05-22 NOTE — PROGRESS NOTE ADULT - PROBLEM SELECTOR PROBLEM 8
Telephone Encounter by Ellen Quiroz CMA at 04/19/18 12:24 PM     Author:  Ellen Quiroz CMA Service:  (none) Author Type:  Certified Medical Assistant     Filed:  04/19/18 12:29 PM Encounter Date:  4/19/2018 Status:  Signed     :  Ellen Quiroz CMA (Certified Medical Assistant)            Last seen[JM1.1M]   Charity Jimenes MD , 9/22/2017  Follow-up in 1yr unless issues arise[JM1.1C]    Per 3/6/18 tel msg -[JM1.1M] Would be ok to do 1mo trial of singulair 5mg once daily, however I would recommend starting now to make sure tolerates [place] issue [place].[JM1.1C]    My chart message sent to parent to see how pt has been tolerating Singulair.[JM1.1M]             Revision History        User Key Date/Time User Provider Type Action    > JM1.1 04/19/18 12:29 PM Ellen Quiroz CMA Certified Medical Assistant Sign    C - Copied, M - Manual             GERD (gastroesophageal reflux disease)

## 2023-05-22 NOTE — PROVIDER CONTACT NOTE (OTHER) - REASON
Pt complaining of 7/10 back pain
Patient Desatted while on 3L NC
Pt c/o 7/10  lower back pain
Pt has a temp of 101.1
temp 100.4, request for sleep aide, ? holding 6am lovenox

## 2023-05-22 NOTE — PROVIDER CONTACT NOTE (OTHER) - ACTION/TREATMENT ORDERED:
Keep patient on 5L NC
hold lovenox, monitor temp
MD notified, IV tylenol ordered
MD said to administer PRN Tylenol and draw blood cultures, will continue to monitor pt
PO 10mg toradol ordered.

## 2023-05-22 NOTE — PROVIDER CONTACT NOTE (OTHER) - SITUATION
Pt complaining of 7/10 back pain. toradol not due at this time
Patient desatted to 78 and felt short of breath while on 3L NC
Pt c/o 7/10  lower back pain
Pt has a temp of 101.1
pt requesting sleep aide, temp 100.4, ? holding lovenox

## 2023-05-22 NOTE — PROVIDER CONTACT NOTE (OTHER) - BACKGROUND
pt admitted for metastic malignant neoplasm
pt was admitted for metastatic cancer
Patient admitted with hypoxia and metastatic CA
Pt admitted with malignant neoplasm and has pmhx of hypothyroidism and aortic thrombosis.
Pt admitted for metastatic neoplasm of unknown primary site

## 2023-05-22 NOTE — PROGRESS NOTE ADULT - PROBLEM SELECTOR PLAN 6
Noted systolic blood pressures persistently 160-170 during admission. No prior hx HTN, not on antihypertensives  - may be in setting of pain, acute stress  - asymptomatic  - started 2.5 amlodipine today due to persistent elevation  - PCP f/u Noted systolic blood pressures persistently 160-170 during admission. No prior hx HTN, not on antihypertensives  - may be in setting of pain, acute stress  - asymptomatic  - started 2.5 amlodipine due to persistent elevation  - PCP f/u

## 2023-05-22 NOTE — PROGRESS NOTE ADULT - ATTENDING COMMENTS
Pt is 72 year old woman with a medical history of newly diagnosed poorly differentiated carcinoma of unclear origin admitted for further evaluation of etiology of malignancy and management of bone pain, found to have a large non-occlusive distal thoracic aortic thrombus on CT, started on AC and course complicated by Pneumonia now requiring O2    #PNA: desats on ambulation, requires o2, CM coordinating home o2, complete 10 day course and transition to oral levaquin on discharge.  #Malignancy of unknown origin: will need close heme onc follow up  #Thoracic aorta thrombus: continue AC, cards f/u  rest as above

## 2023-05-22 NOTE — PROVIDER CONTACT NOTE (OTHER) - ASSESSMENT
Pt c/o 7/10  lower back pain
Pt complaining of 7/10 back pain
temp 100.4, no other issues noted.
Patient O2 sat is 78, complaining of shortness of breath.
Pt is otherwise stable on 4L of oxygen

## 2023-05-22 NOTE — PROGRESS NOTE ADULT - PROBLEM SELECTOR PLAN 4
Pt endorses taking Tylenol & Advil alternating q3-4 hours at home to try and control her pain. Will start pain regimen & assess need  - PO Tylenol for mild pain  - PO Toradol for moderate to severe pain  - Pt refusing opioids Pt endorses taking Tylenol & Advil alternating q3-4 hours at home to try and control her pain. Will start pain regimen & assess need  - PO Tylenol for mild pain  - PO Toradol for moderate to severe pain  - Pt declining opioids

## 2023-05-22 NOTE — PROGRESS NOTE ADULT - PROBLEM SELECTOR PLAN 1
Noted desat to 80% on RA on 5/15, placed on 6L NC with improvement. Unable to wean to room air due to recurrent desaturation. Also reporting worsened cough (pt sometimes has cough in setting of reflux at home)  - developed fever on 5/17 to Tm 101.1  - given new fever, cough, hypoxia, concern for CAP. New LLL opacity observed on 5/17 CXR (not seen on 5/15 CT)  - RVP x2 negative  - started ceftriaxone (5/17-) and azithromycin (5/17, d/scott after urine Legionella negative)       - given immunocompromise plan to treat 10 days course (switch to levaquin on d/c)  - BCx ngtd prelim  - incentive spirometry  - guaifenesin, tessalon perles  - wean O2 as tolerated - pt will be discharged with home O2 while completing abx  - daily ambulatory sats

## 2023-05-23 ENCOUNTER — TRANSCRIPTION ENCOUNTER (OUTPATIENT)
Age: 73
End: 2023-05-23

## 2023-05-23 ENCOUNTER — APPOINTMENT (OUTPATIENT)
Dept: HEMATOLOGY ONCOLOGY | Facility: CLINIC | Age: 73
End: 2023-05-23

## 2023-05-23 VITALS
SYSTOLIC BLOOD PRESSURE: 130 MMHG | OXYGEN SATURATION: 95 % | HEART RATE: 79 BPM | DIASTOLIC BLOOD PRESSURE: 71 MMHG | RESPIRATION RATE: 19 BRPM | TEMPERATURE: 98 F

## 2023-05-23 PROCEDURE — 99232 SBSQ HOSP IP/OBS MODERATE 35: CPT | Mod: GC

## 2023-05-23 PROCEDURE — 99239 HOSP IP/OBS DSCHRG MGMT >30: CPT

## 2023-05-23 RX ORDER — LIDOCAINE 4 G/100G
1 CREAM TOPICAL
Qty: 1 | Refills: 0
Start: 2023-05-23 | End: 2023-05-27

## 2023-05-23 RX ORDER — AMLODIPINE BESYLATE 2.5 MG/1
1 TABLET ORAL
Qty: 30 | Refills: 0
Start: 2023-05-23 | End: 2023-06-21

## 2023-05-23 RX ORDER — LIDOCAINE 4 G/100G
1 CREAM TOPICAL ONCE
Refills: 0 | Status: COMPLETED | OUTPATIENT
Start: 2023-05-23 | End: 2023-05-23

## 2023-05-23 RX ORDER — LEVOFLOXACIN 5 MG/ML
1 INJECTION, SOLUTION INTRAVENOUS
Qty: 3 | Refills: 0
Start: 2023-05-23 | End: 2023-05-25

## 2023-05-23 RX ORDER — RIVAROXABAN 15 MG-20MG
1 KIT ORAL
Qty: 1 | Refills: 0
Start: 2023-05-23 | End: 2023-06-21

## 2023-05-23 RX ORDER — APIXABAN 2.5 MG/1
1 TABLET, FILM COATED ORAL
Qty: 1 | Refills: 0
Start: 2023-05-23 | End: 2023-06-21

## 2023-05-23 RX ADMIN — Medication 10 MILLIGRAM(S): at 02:11

## 2023-05-23 RX ADMIN — Medication 75 MICROGRAM(S): at 07:14

## 2023-05-23 RX ADMIN — Medication 10 MILLIGRAM(S): at 03:10

## 2023-05-23 RX ADMIN — RIVAROXABAN 15 MILLIGRAM(S): KIT at 09:31

## 2023-05-23 RX ADMIN — LIDOCAINE 1 PATCH: 4 CREAM TOPICAL at 16:27

## 2023-05-23 RX ADMIN — LIDOCAINE 1 PATCH: 4 CREAM TOPICAL at 19:04

## 2023-05-23 RX ADMIN — AMLODIPINE BESYLATE 2.5 MILLIGRAM(S): 2.5 TABLET ORAL at 07:14

## 2023-05-23 RX ADMIN — PANTOPRAZOLE SODIUM 40 MILLIGRAM(S): 20 TABLET, DELAYED RELEASE ORAL at 07:14

## 2023-05-23 RX ADMIN — RIVAROXABAN 15 MILLIGRAM(S): KIT at 18:55

## 2023-05-23 RX ADMIN — Medication 1000 UNIT(S): at 11:33

## 2023-05-23 RX ADMIN — Medication 100 MILLIGRAM(S): at 07:14

## 2023-05-23 RX ADMIN — Medication 100 MILLIGRAM(S): at 13:17

## 2023-05-23 RX ADMIN — LIDOCAINE 1 PATCH: 4 CREAM TOPICAL at 07:46

## 2023-05-23 RX ADMIN — Medication 10 MILLIGRAM(S): at 11:33

## 2023-05-23 RX ADMIN — Medication 10 MILLIGRAM(S): at 12:29

## 2023-05-23 NOTE — PROGRESS NOTE ADULT - ATTENDING COMMENTS
patient seen with oncology team with fellow Dr. Cuevas.  Pt will be discharged home today with home o2 and close follow up with oncology regarding next therapeutic steps for her malignancy.  35 minutes on discharge plan of care including reviewing instructions with patient and care team.

## 2023-05-23 NOTE — PROGRESS NOTE ADULT - PROBLEM SELECTOR PLAN 5
Pt intermittently requiring transfusion over past 1-2 months (most recently, Hgb 6.7 on 5/13, s/p 2u pRBC)  - Hgb 10.7 on admission  - trend daily CBC  - transfuse Hgb < 7

## 2023-05-23 NOTE — PROGRESS NOTE ADULT - PROBLEM SELECTOR PROBLEM 3
Thrombosis of thoracic aorta

## 2023-05-23 NOTE — PROGRESS NOTE ADULT - SUBJECTIVE AND OBJECTIVE BOX
DATE OF SERVICE: 05-23-23 @ 07:38    Patient is a 72y old  Female who presents with a chief complaint of Malignancy workup (22 May 2023 07:29)      SUBJECTIVE / OVERNIGHT EVENTS:  No acute events overnight  Afebrile, hemodynamically stable. SpO2 % on 4L  ***    MEDICATIONS  (STANDING):  amLODIPine   Tablet 2.5 milliGRAM(s) Oral daily  benzonatate 100 milliGRAM(s) Oral three times a day  cholecalciferol 1000 Unit(s) Oral daily  levothyroxine 75 MICROGram(s) Oral daily  melatonin 3 milliGRAM(s) Oral at bedtime  pantoprazole    Tablet 40 milliGRAM(s) Oral before breakfast  polyethylene glycol 3350 17 Gram(s) Oral at bedtime  rivaroxaban 15 milliGRAM(s) Oral two times a day with meals  senna 1 Tablet(s) Oral at bedtime    MEDICATIONS  (PRN):  acetaminophen     Tablet .. 650 milliGRAM(s) Oral every 6 hours PRN Temp greater or equal to 38C (100.4F), Mild Pain (1 - 3)  guaiFENesin Oral Liquid (Sugar-Free) 100 milliGRAM(s) Oral every 6 hours PRN Cough  ketorolac 10 milliGRAM(s) Oral every 6 hours PRN Severe Pain (7 - 10)  sodium chloride 0.65% Nasal 1 Spray(s) Both Nostrils every 8 hours PRN Nasal Congestion      Vital Signs Last 24 Hrs  T(C): 37 (23 May 2023 06:32), Max: 37.1 (22 May 2023 21:48)  T(F): 98.6 (23 May 2023 06:32), Max: 98.7 (22 May 2023 21:48)  HR: 70 (23 May 2023 06:32) (65 - 71)  BP: 139/70 (23 May 2023 06:32) (139/70 - 147/80)  BP(mean): --  RR: 17 (23 May 2023 06:32) (16 - 18)  SpO2: 99% (23 May 2023 06:32) (97% - 100%)    Parameters below as of 23 May 2023 06:32  Patient On (Oxygen Delivery Method): nasal cannula      PHYSICAL EXAM:  GENERAL: No apparent distress  HEAD:  Atraumatic, normocephalic  EYES: PERRL, sclera clear  NECK: Supple, no JVD  CHEST/LUNG: Clear to auscultation, non-labored respirations on 3L NC  HEART: Regular rate and rhythm; No murmurs, rubs, or gallops  ABDOMEN: Soft, nontender, nondistended; Bowel sounds present  EXTREMITIES:  No peripheral edema  PSYCH: AAOx3  NEUROLOGY: No focal deficits  SKIN: No rashes or lesions      LABS:                        10.6   8.46  )-----------( 440      ( 22 May 2023 07:21 )             34.6     05-22    134<L>  |  96<L>  |  7   ----------------------------<  110<H>  3.8   |  28  |  0.39<L>    Ca    9.2      22 May 2023 07:21  Phos  3.1     05-22  Mg     2.00     05-22    TPro  6.9  /  Alb  3.0<L>  /  TBili  0.4  /  DBili  x   /  AST  23  /  ALT  40<H>  /  AlkPhos  228<H>  05-22        RADIOLOGY & ADDITIONAL TESTS:  Reviewed   DATE OF SERVICE: 05-23-23 @ 07:38    Patient is a 72y old  Female who presents with a chief complaint of Malignancy workup (22 May 2023 07:29)      SUBJECTIVE / OVERNIGHT EVENTS:  No acute events overnight  Afebrile, hemodynamically stable. SpO2 % on 4L  No new complaints, feels like she is breathing pretty well    MEDICATIONS  (STANDING):  amLODIPine   Tablet 2.5 milliGRAM(s) Oral daily  benzonatate 100 milliGRAM(s) Oral three times a day  cholecalciferol 1000 Unit(s) Oral daily  levothyroxine 75 MICROGram(s) Oral daily  melatonin 3 milliGRAM(s) Oral at bedtime  pantoprazole    Tablet 40 milliGRAM(s) Oral before breakfast  polyethylene glycol 3350 17 Gram(s) Oral at bedtime  rivaroxaban 15 milliGRAM(s) Oral two times a day with meals  senna 1 Tablet(s) Oral at bedtime    MEDICATIONS  (PRN):  acetaminophen     Tablet .. 650 milliGRAM(s) Oral every 6 hours PRN Temp greater or equal to 38C (100.4F), Mild Pain (1 - 3)  guaiFENesin Oral Liquid (Sugar-Free) 100 milliGRAM(s) Oral every 6 hours PRN Cough  ketorolac 10 milliGRAM(s) Oral every 6 hours PRN Severe Pain (7 - 10)  sodium chloride 0.65% Nasal 1 Spray(s) Both Nostrils every 8 hours PRN Nasal Congestion      Vital Signs Last 24 Hrs  T(C): 37 (23 May 2023 06:32), Max: 37.1 (22 May 2023 21:48)  T(F): 98.6 (23 May 2023 06:32), Max: 98.7 (22 May 2023 21:48)  HR: 70 (23 May 2023 06:32) (65 - 71)  BP: 139/70 (23 May 2023 06:32) (139/70 - 147/80)  BP(mean): --  RR: 17 (23 May 2023 06:32) (16 - 18)  SpO2: 99% (23 May 2023 06:32) (97% - 100%)    Parameters below as of 23 May 2023 06:32  Patient On (Oxygen Delivery Method): nasal cannula      PHYSICAL EXAM:  GENERAL: No apparent distress  HEAD:  Atraumatic, normocephalic  EYES: PERRL, sclera clear  NECK: Supple, no JVD  CHEST/LUNG: Clear to auscultation, non-labored respirations on 3L NC  HEART: Regular rate and rhythm; No murmurs, rubs, or gallops  ABDOMEN: Soft, nontender, nondistended; Bowel sounds present  EXTREMITIES:  No peripheral edema  PSYCH: AAOx3  NEUROLOGY: No focal deficits  SKIN: No rashes or lesions      LABS:                        10.6   8.46  )-----------( 440      ( 22 May 2023 07:21 )             34.6     05-22    134<L>  |  96<L>  |  7   ----------------------------<  110<H>  3.8   |  28  |  0.39<L>    Ca    9.2      22 May 2023 07:21  Phos  3.1     05-22  Mg     2.00     05-22    TPro  6.9  /  Alb  3.0<L>  /  TBili  0.4  /  DBili  x   /  AST  23  /  ALT  40<H>  /  AlkPhos  228<H>  05-22        RADIOLOGY & ADDITIONAL TESTS:  Reviewed

## 2023-05-23 NOTE — PROGRESS NOTE ADULT - PROBLEM SELECTOR PLAN 9
DVT ppx: Xarelto  Diet: Regular  Dispo: Home DVT ppx: Xarelto  Diet: Regular  Dispo: Home pending O2 setup

## 2023-05-23 NOTE — PROGRESS NOTE ADULT - PROBLEM SELECTOR PLAN 4
Pt endorses taking Tylenol & Advil alternating q3-4 hours at home to try and control her pain. Will start pain regimen & assess need  - PO Tylenol for mild pain  - PO Toradol for moderate to severe pain  - Pt declining opioids

## 2023-05-23 NOTE — PROGRESS NOTE ADULT - REASON FOR ADMISSION
Malignancy workup

## 2023-05-23 NOTE — PROGRESS NOTE ADULT - PROBLEM SELECTOR PLAN 6
Noted systolic blood pressures persistently 160-170 during admission. No prior hx HTN, not on antihypertensives  - may be in setting of pain, acute stress  - asymptomatic  - started 2.5 amlodipine due to persistent elevation  - PCP f/u

## 2023-05-23 NOTE — PROGRESS NOTE ADULT - PROVIDER SPECIALTY LIST ADULT
Heme/Onc
Heme/Onc
Internal Medicine

## 2023-05-23 NOTE — PROGRESS NOTE ADULT - ASSESSMENT
KRUNAL RUEDA is a 72y Female with a past medical history as described above who presented for evaluation after a biopsy of a lytic bone lesion revealed a carcinoma, oncology consulted for work-up of carcinoma of unknown primary.       # Carcinoma of Unknown Primary   - Was following up with hematologist Dr. Carcamo for MM work-up given anemia and lytic bone lesions, bone marrow biopsy and MM work-up was negative. Biopsy of a lytic lesion revealed carcinoma. No detectable disease on CT imaging, other than hepatomegaly and known aortic thrombus (says that CT was following and no need for intervention?)  - Final pathology has failed to identify an organ-specific diagnosis, thus is a true carcinoma of unknown primary, with poorly differentiated histology.   - Discussed the meaning of this diagnosis with the patient, her son, and niece at bedside. This is an aggressive cancer, so much so that it is able to grow, persist, and live in an organ that it is not native to and exhibits a poorly differentiated phenotype (immature/primordial).   - The treatment options were discussed at length. Our best chance to control her disease is to offer combination  chemotherapeutic agents that possess activity across a wide range of cancers. This would include carboplatin/paclitaxel, paclitaxel/gemcitabine, etc.   - Tumor markers have been unrevealing   - Plan for DC today with home O2. Will organize follow-up at [facility] with Dr. Gill and port placement.   - All questions answered to the patient and family's apparent satisfaction.       Kyle Cuevas MD, PGY-4  Hematology/Medical Oncology Fellow  Pager: [phone]  Available on Microsoft Teams  After 5pm or on weekends please contact  to page on-call fellow 
Ms. Kiesha Rojas is a 72 year old female with PMH of newly diagnosed poorly differentiated carcinoma of unclear origin, hypothyroidism, psoriasis, admitted for further workup of new cancer & management of bone pain. Noted to have large non-occlusive distal thoracic aortic thrombus on CT, started on AC. Course c/b fever, hypoxia, initiated treatment for CAP.
Ms. Kiesha Rojas is a 72 year old female with PMH of newly diagnosed poorly differentiated carcinoma of unclear origin, hypothyroidism, psoriasis, admitted for further workup of new cancer & management of bone pain. Noted to have large non-occlusive distal thoracic aortic thrombus on CT. 
KRUNAL RUEDA is a 72y Female with a past medical history as described above who presented for evaluation after a biopsy of a lytic bone lesion revealed a carcinoma, oncology consulted for work-up of carcinoma of unknown primary.       # Carcinoma of Unknown Primary   - Was following up with hematologist Dr. Carcamo for MM work-up given anemia and lytic bone lesions, bone marrow biopsy and MM work-up was negative. Biopsy of a lytic lesion revealed carcinoma. No detectable disease on CT imaging, other than hepatomegaly and known aortic thrombus (says that CT was following and no need for intervention?)  - Final pathology has failed to identify an organ-specific diagnosis, thus is a true carcinoma of unknown primary, with poorly differentiated histology.   - Discussed the meaning of this diagnosis with the patient, her son, and niece at bedside. This is an aggressive cancer, so much so that it is able to grow, persist, and live in an organ that it is not native to and exhibits a poorly differentiated phenotype (immature/primordial).   - The treatment options were discussed at length. Our best chance to control her disease is to offer combination  chemotherapeutic agents that possess activity across a wide range of cancers. This would include carboplatin/paclitaxel, paclitaxel/gemcitabine, etc.   - Tumor markers have been unrevealing   - Recommend urgent port placement for chemotherapy, if can be done tomorrow by surg or IR. Would need to hold AC. If this cannot be arranged, would recommend DC if medically stable with close oncologic follow-up. Will arrange follow-up at [facility].   - All questions answered to the patient and family's apparent satisfaction.       Kyle Cuevas MD, PGY-4  Hematology/Medical Oncology Fellow  Pager: [phone]  Available on Microsoft Teams  After 5pm or on weekends please contact  to page on-call fellow 
Ms. Kiesha Rojas is a 72 year old female with PMH of newly diagnosed poorly differentiated carcinoma of unclear origin, hypothyroidism, psoriasis, admitted for further workup of new cancer & management of bone pain. Noted to have large non-occlusive distal thoracic aortic thrombus on CT, started on AC. Course c/b fever, hypoxia, initiated treatment for CAP.
Ms. Kiesha Rojas is a 72 year old female with PMH of newly diagnosed poorly differentiated carcinoma of unclear origin, hypothyroidism, psoriasis, admitted for further workup of new cancer & management of bone pain. Noted to have large non-occlusive distal thoracic aortic thrombus on CT. 
Ms. Kiesha Rojas is a 72 year old female with PMH of newly diagnosed poorly differentiated carcinoma of unclear origin, hypothyroidism, psoriasis, admitted for further workup of new cancer & management of bone pain. Noted to have large non-occlusive distal thoracic aortic thrombus on CT, started on AC. Course c/b fever, hypoxia, initiated treatment for CAP.
Ms. Kiesha Rojas is a 72 year old female with PMH of newly diagnosed poorly differentiated carcinoma of unclear origin, hypothyroidism, psoriasis, admitted for further workup of new cancer & management of bone pain. Noted to have large non-occlusive distal thoracic aortic thrombus on CT, started on AC. Course c/b fever, hypoxia, initiated treatment for CAP.

## 2023-05-23 NOTE — PROGRESS NOTE ADULT - PROBLEM SELECTOR PROBLEM 4
Cancer related pain
Cancer related pain
Malignancy
Cancer related pain

## 2023-05-23 NOTE — PROGRESS NOTE ADULT - SUBJECTIVE AND OBJECTIVE BOX
KRUNAL RUEDA 72y Female      Patient is a 72y old  Female who presents with a chief complaint of Malignancy workup (23 May 2023 07:38)        INTERVAL HPI/OVERNIGHT EVENTS: No acute events overnight. Patient was seen and evaluated at the bedside. The patient denies pain. Vitals stable. Patient denies fever/chills, chest pain, shortness of breath, abdominal pain, headaches, nausea/vomiting, and diarrhea/constipation.      PHYSICAL EXAM:  GENERAL: NAD  HEAD:  Normocephalic  EYES:  conjunctiva and sclera clear  ENMT: Moist mucous membranes  NECK: Supple  NERVOUS SYSTEM:  Alert, awake  CHEST/LUNG: Good air exchange bilaterally, no wheeze  HEART: Regular rate and rhythm        Vital Signs Last 24 Hrs  T(C): 36.7 (23 May 2023 14:53), Max: 37.1 (22 May 2023 21:48)  T(F): 98.1 (23 May 2023 14:53), Max: 98.7 (22 May 2023 21:48)  HR: 79 (23 May 2023 14:53) (70 - 79)  BP: 130/71 (23 May 2023 14:53) (130/71 - 147/80)  BP(mean): --  RR: 19 (23 May 2023 14:53) (17 - 19)  SpO2: 95% (23 May 2023 14:53) (95% - 100%)    Parameters below as of 23 May 2023 14:53  Patient On (Oxygen Delivery Method): nasal cannula  O2 Flow (L/min): 3        MEDICATIONS  (STANDING):  amLODIPine   Tablet 2.5 milliGRAM(s) Oral daily  benzonatate 100 milliGRAM(s) Oral three times a day  cholecalciferol 1000 Unit(s) Oral daily  levothyroxine 75 MICROGram(s) Oral daily  melatonin 3 milliGRAM(s) Oral at bedtime  pantoprazole    Tablet 40 milliGRAM(s) Oral before breakfast  polyethylene glycol 3350 17 Gram(s) Oral at bedtime  rivaroxaban 15 milliGRAM(s) Oral two times a day with meals  senna 1 Tablet(s) Oral at bedtime    MEDICATIONS  (PRN):  acetaminophen     Tablet .. 650 milliGRAM(s) Oral every 6 hours PRN Temp greater or equal to 38C (100.4F), Mild Pain (1 - 3)  guaiFENesin Oral Liquid (Sugar-Free) 100 milliGRAM(s) Oral every 6 hours PRN Cough  ketorolac 10 milliGRAM(s) Oral every 6 hours PRN Severe Pain (7 - 10)  sodium chloride 0.65% Nasal 1 Spray(s) Both Nostrils every 8 hours PRN Nasal Congestion      Consultant(s) Notes Reviewed:  [X] YES  [ ] NO  Care Discussed with Other Providers [X] YES  [ ] NO  Imaging Personally Reviewed:  [X] YES  [ ] NO      LABS:                        10.6   8.46  )-----------( 440      ( 22 May 2023 07:21 )             34.6     05-22    134<L>  |  96<L>  |  7   ----------------------------<  110<H>  3.8   |  28  |  0.39<L>    Ca    9.2      22 May 2023 07:21  Phos  3.1     05-22  Mg     2.00     05-22    TPro  6.9  /  Alb  3.0<L>  /  TBili  0.4  /  DBili  x   /  AST  23  /  ALT  40<H>  /  AlkPhos  228<H>  05-22

## 2023-05-23 NOTE — DISCHARGE NOTE NURSING/CASE MANAGEMENT/SOCIAL WORK - NSDCPEFALRISK_GEN_ALL_CORE
For information on Fall & Injury Prevention, visit: [email or web address] OR  [email or web address] OR  https://www.cdc.gov/steadi/patient.html

## 2023-05-23 NOTE — PROGRESS NOTE ADULT - PROBLEM SELECTOR PROBLEM 9
Encounter for preventive measure

## 2023-05-23 NOTE — CHART NOTE - NSCHARTNOTEFT_GEN_A_CORE
Resting, room air: SpO2 93%  Exercise, room air: SpO2 87%  Exercise, 3L NC: SpO2 94%    Pt requires continuous supplemental oxygen at home while completing treatment for pneumonia.    Amor CÁRDENAS

## 2023-05-23 NOTE — PROGRESS NOTE ADULT - NUTRITIONAL ASSESSMENT
This patient has been assessed with a concern for Malnutrition and has been determined to have a diagnosis/diagnoses of Severe protein-calorie malnutrition and Underweight (BMI < 19).    This patient is being managed with:   Diet Regular-  Kosher  Entered: May 15 2023  7:44PM  

## 2023-05-23 NOTE — DISCHARGE NOTE NURSING/CASE MANAGEMENT/SOCIAL WORK - PATIENT PORTAL LINK FT
You can access the FollowMyHealth Patient Portal offered by [facility] by registering at the following website: [email or web address]. By joining [employer]’s FollowMyHealth portal, you will also be able to view your health information using other applications (apps) compatible with our system.

## 2023-05-23 NOTE — PROGRESS NOTE ADULT - ATTENDING COMMENTS
Patient seen/examined at bedside; agree with the assessment and recommendations as outlined in Dr. Cuevas's note.   Oncologic workup including imaging studies, pathology findings c/w metastatic carcinoma of unknown origin reviewed in detail with the patient and son at bedside. Potential treatment options reviewed which would likely include combination chemotherapy regimen. Patient to be discharged home on oxygen later today and will f/u with Dr. Gill at [facility] for further treatment planning.

## 2023-05-24 ENCOUNTER — NON-APPOINTMENT (OUTPATIENT)
Age: 73
End: 2023-05-24

## 2023-05-24 DIAGNOSIS — D64.9 ANEMIA, UNSPECIFIED: ICD-10-CM

## 2023-05-24 RX ORDER — RIVAROXABAN 20 MG/1
20 TABLET, FILM COATED ORAL
Qty: 90 | Refills: 1 | Status: ACTIVE | COMMUNITY

## 2023-05-24 RX ORDER — AMLODIPINE BESYLATE 2.5 MG/1
2.5 TABLET ORAL DAILY
Refills: 0 | Status: ACTIVE | COMMUNITY

## 2023-05-24 RX ORDER — LIDOCAINE 40 MG/G
4 PATCH TOPICAL
Refills: 0 | Status: ACTIVE | COMMUNITY

## 2023-05-25 ENCOUNTER — RESULT REVIEW (OUTPATIENT)
Age: 73
End: 2023-05-25

## 2023-05-25 ENCOUNTER — APPOINTMENT (OUTPATIENT)
Dept: HEMATOLOGY ONCOLOGY | Facility: CLINIC | Age: 73
End: 2023-05-25

## 2023-05-25 LAB
BASOPHILS # BLD AUTO: 0.04 K/UL — SIGNIFICANT CHANGE UP (ref 0–0.2)
BASOPHILS NFR BLD AUTO: 0.4 % — SIGNIFICANT CHANGE UP (ref 0–2)
EOSINOPHIL # BLD AUTO: 0.05 K/UL — SIGNIFICANT CHANGE UP (ref 0–0.5)
EOSINOPHIL NFR BLD AUTO: 0.4 % — SIGNIFICANT CHANGE UP (ref 0–6)
HCT VFR BLD CALC: 40.2 % — SIGNIFICANT CHANGE UP (ref 34.5–45)
HGB BLD-MCNC: 12.2 G/DL — SIGNIFICANT CHANGE UP (ref 11.5–15.5)
IMM GRANULOCYTES NFR BLD AUTO: 0.9 % — SIGNIFICANT CHANGE UP (ref 0–0.9)
LYMPHOCYTES # BLD AUTO: 0.7 K/UL — LOW (ref 1–3.3)
LYMPHOCYTES # BLD AUTO: 6.2 % — LOW (ref 13–44)
MCHC RBC-ENTMCNC: 26.6 PG — LOW (ref 27–34)
MCHC RBC-ENTMCNC: 30.3 G/DL — LOW (ref 32–36)
MCV RBC AUTO: 87.6 FL — SIGNIFICANT CHANGE UP (ref 80–100)
MONOCYTES # BLD AUTO: 1.46 K/UL — HIGH (ref 0–0.9)
MONOCYTES NFR BLD AUTO: 12.9 % — SIGNIFICANT CHANGE UP (ref 2–14)
NEUTROPHILS # BLD AUTO: 8.97 K/UL — HIGH (ref 1.8–7.4)
NEUTROPHILS NFR BLD AUTO: 79.2 % — HIGH (ref 43–77)
NRBC # BLD: 0 /100 WBCS — SIGNIFICANT CHANGE UP (ref 0–0)
PLATELET # BLD AUTO: 474 K/UL — HIGH (ref 150–400)
RBC # BLD: 4.59 M/UL — SIGNIFICANT CHANGE UP (ref 3.8–5.2)
RBC # FLD: 16.4 % — HIGH (ref 10.3–14.5)
WBC # BLD: 11.32 K/UL — HIGH (ref 3.8–10.5)
WBC # FLD AUTO: 11.32 K/UL — HIGH (ref 3.8–10.5)

## 2023-05-25 PROCEDURE — 86850 RBC ANTIBODY SCREEN: CPT

## 2023-05-25 PROCEDURE — 86923 COMPATIBILITY TEST ELECTRIC: CPT

## 2023-05-25 PROCEDURE — 86900 BLOOD TYPING SEROLOGIC ABO: CPT

## 2023-05-25 PROCEDURE — 86901 BLOOD TYPING SEROLOGIC RH(D): CPT

## 2023-05-26 ENCOUNTER — APPOINTMENT (OUTPATIENT)
Dept: INFUSION THERAPY | Facility: HOSPITAL | Age: 73
End: 2023-05-26

## 2023-05-30 ENCOUNTER — APPOINTMENT (OUTPATIENT)
Dept: HEMATOLOGY ONCOLOGY | Facility: CLINIC | Age: 73
End: 2023-05-30

## 2023-05-30 RX ORDER — POLYETHYLENE GLYCOL 3350 AND ELECTROLYTES WITH LEMON FLAVOR 236; 22.74; 6.74; 5.86; 2.97 G/4L; G/4L; G/4L; G/4L; G/4L
236 POWDER, FOR SOLUTION ORAL
Qty: 1 | Refills: 0 | Status: DISCONTINUED | COMMUNITY
Start: 2023-02-06 | End: 2023-05-30

## 2023-06-01 ENCOUNTER — APPOINTMENT (OUTPATIENT)
Dept: INFUSION THERAPY | Facility: HOSPITAL | Age: 73
End: 2023-06-01

## 2023-06-02 ENCOUNTER — APPOINTMENT (OUTPATIENT)
Dept: HEMATOLOGY ONCOLOGY | Facility: CLINIC | Age: 73
End: 2023-06-02

## 2023-06-02 ENCOUNTER — APPOINTMENT (OUTPATIENT)
Age: 73
End: 2023-06-02

## 2023-06-02 DIAGNOSIS — C80.1 MALIGNANT (PRIMARY) NEOPLASM, UNSPECIFIED: ICD-10-CM

## 2025-04-20 NOTE — PROVIDER CONTACT NOTE (OTHER) - RECOMMENDATIONS
MD to evaluate pt. Administer pain medication.
Administer Tylenol and continue to monitor pt
See orders and continue to monitor temp
Notify MD
Patient placed back on 5L NC and improved to 93%
Right arm;